# Patient Record
Sex: FEMALE | Race: WHITE | NOT HISPANIC OR LATINO | Employment: UNEMPLOYED | URBAN - METROPOLITAN AREA
[De-identification: names, ages, dates, MRNs, and addresses within clinical notes are randomized per-mention and may not be internally consistent; named-entity substitution may affect disease eponyms.]

---
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
## 2020-11-16 ENCOUNTER — HEALTH MAINTENANCE LETTER (OUTPATIENT)
Age: 20
End: 2020-11-16

## 2021-01-07 ENCOUNTER — HOSPITAL ENCOUNTER (EMERGENCY)
Facility: CLINIC | Age: 21
Discharge: HOME | End: 2021-01-07
Attending: FAMILY MEDICINE | Admitting: FAMILY MEDICINE
Payer: COMMERCIAL

## 2021-01-07 VITALS
TEMPERATURE: 99 F | BODY MASS INDEX: 32.33 KG/M2 | OXYGEN SATURATION: 98 % | HEIGHT: 67 IN | SYSTOLIC BLOOD PRESSURE: 144 MMHG | WEIGHT: 206 LBS | HEART RATE: 85 BPM | DIASTOLIC BLOOD PRESSURE: 97 MMHG | RESPIRATION RATE: 18 BRPM

## 2021-01-07 DIAGNOSIS — J02.0 STREPTOCOCCAL PHARYNGITIS: ICD-10-CM

## 2021-01-07 DIAGNOSIS — B27.90 INFECTIOUS MONONUCLEOSIS WITHOUT COMPLICATION, INFECTIOUS MONONUCLEOSIS DUE TO UNSPECIFIED ORGANISM: ICD-10-CM

## 2021-01-07 LAB
BASOPHILS # BLD AUTO: 0 10E9/L (ref 0–0.2)
BASOPHILS NFR BLD AUTO: 0 %
DEPRECATED S PYO AG THROAT QL EIA: POSITIVE
DIFFERENTIAL METHOD BLD: ABNORMAL
EOSINOPHIL # BLD AUTO: 0 10E9/L (ref 0–0.7)
EOSINOPHIL NFR BLD AUTO: 0 %
ERYTHROCYTE [DISTWIDTH] IN BLOOD BY AUTOMATED COUNT: 14.1 % (ref 10–15)
HCT VFR BLD AUTO: 37 % (ref 35–47)
HETEROPH AB SER QL: POSITIVE
HGB BLD-MCNC: 13.2 G/DL (ref 11.7–15.7)
LYMPHOCYTES # BLD AUTO: 8.3 10E9/L (ref 0.8–5.3)
LYMPHOCYTES NFR BLD AUTO: 59 %
MCH RBC QN AUTO: 33.3 PG (ref 26.5–33)
MCHC RBC AUTO-ENTMCNC: 35.7 G/DL (ref 31.5–36.5)
MCV RBC AUTO: 93 FL (ref 78–100)
MONOCYTES # BLD AUTO: 1 10E9/L (ref 0–1.3)
MONOCYTES NFR BLD AUTO: 7 %
NEUTROPHILS # BLD AUTO: 4.8 10E9/L (ref 1.6–8.3)
NEUTROPHILS NFR BLD AUTO: 34 %
PLATELET # BLD AUTO: 152 10E9/L (ref 150–450)
PLATELET # BLD EST: ABNORMAL 10*3/UL
RBC # BLD AUTO: 3.96 10E12/L (ref 3.8–5.2)
RBC MORPH BLD: NORMAL
SPECIMEN SOURCE: ABNORMAL
VARIANT LYMPHS BLD QL SMEAR: PRESENT
WBC # BLD AUTO: 14.1 10E9/L (ref 4–11)

## 2021-01-07 PROCEDURE — 99283 EMERGENCY DEPT VISIT LOW MDM: CPT | Performed by: FAMILY MEDICINE

## 2021-01-07 PROCEDURE — 99284 EMERGENCY DEPT VISIT MOD MDM: CPT | Performed by: FAMILY MEDICINE

## 2021-01-07 PROCEDURE — 85025 COMPLETE CBC W/AUTO DIFF WBC: CPT | Performed by: FAMILY MEDICINE

## 2021-01-07 PROCEDURE — 87880 STREP A ASSAY W/OPTIC: CPT | Performed by: FAMILY MEDICINE

## 2021-01-07 PROCEDURE — 86308 HETEROPHILE ANTIBODY SCREEN: CPT | Performed by: FAMILY MEDICINE

## 2021-01-07 RX ORDER — PREDNISONE 20 MG/1
40 TABLET ORAL DAILY
Qty: 14 TABLET | Refills: 0 | Status: SHIPPED | OUTPATIENT
Start: 2021-01-07 | End: 2021-01-14

## 2021-01-07 RX ORDER — CEPHALEXIN 500 MG/1
500 CAPSULE ORAL 3 TIMES DAILY
Qty: 30 CAPSULE | Refills: 0 | Status: SHIPPED | OUTPATIENT
Start: 2021-01-07 | End: 2021-01-17

## 2021-01-07 ASSESSMENT — MIFFLIN-ST. JEOR: SCORE: 1737.04

## 2021-01-07 NOTE — ED PROVIDER NOTES
History   No chief complaint on file.    HPI  Brunilda Krishna is a 20 year old female who presents with sore throat.  She says been present for 1 to 2 weeks.  She has swollen glands in her neck.  She does not have a cough.  Her maximum temperature has been 99 9.  She denies headache.  She takes thyroid replacement hormone for history of hypothyroidism due to Hashimoto's.    Allergies:  Allergies   Allergen Reactions     Augmentin        Problem List:    Patient Active Problem List    Diagnosis Date Noted     Hashimoto's thyroiditis 09/21/2016     Priority: Medium     Enlarged thyroid gland 04/12/2016     Priority: Medium     Arthritis 05/30/2014     Priority: Medium     Depression, major, single episode, moderate (H) 06/21/2013     Priority: Medium     Acne 08/09/2012     Priority: Medium     Behavior problems 01/22/2010     Priority: Medium     Frequent tantrums- in counseling           Past Medical History:    Past Medical History:   Diagnosis Date     Hypothyroid      Nonsuppurative otitis media, not specified as acute or chronic      Other developmental speech or language disorder      Reflux esophagitis      Unspecified asthma(493.90)        Past Surgical History:    Past Surgical History:   Procedure Laterality Date     PE TUBES  3/2007     PE TUBES  12-09     SURGICAL HISTORY OF -   2/5/2003     Bilateral myringotomy with tubes     SURGICAL HISTORY OF -   2/21/2005    Bilateral myringotomy with tubes and adenoidectomy       Family History:    Family History   Problem Relation Age of Onset     Asthma Mother      Asthma Maternal Grandmother      Allergies Maternal Grandmother      Diabetes Maternal Grandmother      Hypertension Maternal Grandfather      Cancer Paternal Grandmother      Hypothyroidism Other         history on both sides     C.A.D. No family hx of      Cerebrovascular Disease No family hx of      Breast Cancer No family hx of      Cancer - colorectal No family hx of      Prostate Cancer No  "family hx of        Social History:  Marital Status:  Single [1]  Social History     Tobacco Use     Smoking status: Never Smoker     Smokeless tobacco: Never Used   Substance Use Topics     Alcohol use: No     Drug use: No        Medications:         cephALEXin (KEFLEX) 500 MG capsule       predniSONE (DELTASONE) 20 MG tablet       levothyroxine (SYNTHROID/LEVOTHROID) 125 MCG tablet          Review of Systems    Further problem focused system review negative.    Physical Exam   BP: (!) 144/97  Pulse: 85  Temp: 99  F (37.2  C)  Resp: 18  Height: 170.2 cm (5' 7\")  Weight: 93.4 kg (206 lb)  SpO2: 98 %      Physical Exam    Nursing note and vitals were reviewed.  Constitutional: Awake and alert, adequately nourished and developed appearing 20-year-old in mild discomfort, who does not appear acutely ill, and who answers questions appropriately and cooperates with examination.  HEENT: Oropharynx shows tonsillar erythema and exudate with tonsils 2+ in size EOMI. voice quality is hyponasal.  No trismus is present.  Neck: Freely mobile.  There is posterior cervical adenopathy present especially on the right side  Cardiovascular: Cardiac examination reveals normal heart rate and regular rhythm without murmur.  Pulmonary/Chest: Breathing is unlabored.  Breath sounds are clear and equal bilaterally.  There no retractions, tachypnea, rales, wheezes, or rhonchi.  Neurological: Alert, oriented, thought content logical, coherent   Skin: Warm, dry, no rashes.  Psychiatric: Affect broad and appropriate.      ED Course        Procedures               Critical Care time:  none               Results for orders placed or performed during the hospital encounter of 01/07/21 (from the past 24 hour(s))   Streptococcus A Rapid Scr w Reflx to PCR    Specimen: Throat   Result Value Ref Range    Strep Specimen Description Throat     Streptococcus Group A Rapid Screen Positive (A) NEG^Negative   CBC with platelets differential   Result Value Ref " Range    WBC 14.1 (H) 4.0 - 11.0 10e9/L    RBC Count 3.96 3.8 - 5.2 10e12/L    Hemoglobin 13.2 11.7 - 15.7 g/dL    Hematocrit 37.0 35.0 - 47.0 %    MCV 93 78 - 100 fl    MCH 33.3 (H) 26.5 - 33.0 pg    MCHC 35.7 31.5 - 36.5 g/dL    RDW 14.1 10.0 - 15.0 %    Platelet Count 152 150 - 450 10e9/L    Diff Method Manual Differential     % Neutrophils 34.0 %    % Lymphocytes 59.0 %    % Monocytes 7.0 %    % Eosinophils 0.0 %    % Basophils 0.0 %    Absolute Neutrophil 4.8 1.6 - 8.3 10e9/L    Absolute Lymphocytes 8.3 (H) 0.8 - 5.3 10e9/L    Absolute Monocytes 1.0 0.0 - 1.3 10e9/L    Absolute Eosinophils 0.0 0.0 - 0.7 10e9/L    Absolute Basophils 0.0 0.0 - 0.2 10e9/L    RBC Morphology Normal     Platelet Estimate       Automated count confirmed.  Platelet morphology is normal.    Reactive Lymphs Present    Mono   Result Value Ref Range    Mononucleosis Screen Positive (A) NEG^Negative       Medications - No data to display    Assessments & Plan (with Medical Decision Making)     20-year-old presents with 1 to 2 weeks of sore throat.  Physical examination reveals normal vital signs.  There is no trismus present and no red flag signs.  There is posterior cervical adenopathy present along with tonsillar erythema and exudate.  Rapid strep test is positive.  Mono screen is positive.  CBC is as expected.  I discussed with the patient that she may have simultaneous infection with strep and mono though 1 or both of these could be false positives.  I recommended treatment for strep with cephalexin.  She gets a rash from Augmentin.  I recommended a week of prednisone for pain and swelling with the mono.  We discussed the expected clinical course of mono.  We discussed signs and symptoms that should prompt reevaluation.  She expressed understanding and her questions were all answered.    I have reviewed the nursing notes.    I have reviewed the findings, diagnosis, plan and need for follow up with the patient.       Discharge Medication  List as of 1/7/2021  7:35 AM      START taking these medications    Details   cephALEXin (KEFLEX) 500 MG capsule Take 1 capsule (500 mg) by mouth 3 times daily for 10 days, Disp-30 capsule, R-0, E-Prescribe      predniSONE (DELTASONE) 20 MG tablet Take 2 tablets (40 mg) by mouth daily for 7 days, Disp-14 tablet, R-0, E-Prescribe             Final diagnoses:   Infectious mononucleosis without complication, infectious mononucleosis due to unspecified organism   Streptococcal pharyngitis       1/7/2021   [facility] EMERGENCY DEPT     Isaac Roberson MD  01/07/21 [id number]

## 2021-01-07 NOTE — ED NOTES
Here with c/o ongoing sore throat for past 2 weeks & low grade fever. Has had strep in the past & feels similar.

## 2021-01-07 NOTE — ED AVS SNAPSHOT
[facility] Emergency Dept  [address] [address]  Phone: [phone]  Fax: [phone]                                    Brunilda Krishna   MRN: [id number]    Department: [facility] Emergency Dept   Date of Visit: 1/7/2021           After Visit Summary Signature Page    I have received my discharge instructions, and my questions have been answered. I have discussed any challenges I see with this plan with the nurse or doctor.    ..........................................................................................................................................  Patient/Patient Representative Signature      ..........................................................................................................................................  Patient Representative Print Name and Relationship to Patient    ..................................................               ................................................  Date                                   Time    ..........................................................................................................................................  Reviewed by Signature/Title    ...................................................              ..............................................  Date                                               Time          22EPIC Rev 08/18

## 2021-01-07 NOTE — DISCHARGE INSTRUCTIONS
Mononucleosis  Mononucleosis (also called mono) is a contagious viral infection. Most infants and children exposed to the virus get only mild flu-like symptoms or no symptoms at all. However, infection is usually more serious in teens and young adults. While the virus is active, it causes symptoms and can spread to others. After symptoms subside, the virus stays in the body and eventually becomes inactive. The virus can reactivate and develop symptoms, especially in people with weak immune systems.  The virus is usually spread by contact with saliva, often by kissing, or sharing food or eating utensils. It may also spread by breastmilk, blood, or sexual contact. It takes about 4 to 6 weeks to develop symptoms after exposure.  Early symptoms include headache, nausea, tiredness and general muscle aching. This is followed by sore throat and fever. Lymph glands in the neck, under the arms, or in the groin may be swollen. Symptoms usually go away in about 1 to 2 months. But they can last up to 4 months.  In the first few days to weeks, a common blood test (the monospot test) us ed to diagnose this disease may be negative even though you have the illness. In this case, other tests may be done.  Taking the antibiotics ampicillin or amoxicillin during a mono infection may cause a skin rash. This is not serious and will fade in about a week. The rash may not mean you are having an allergic reaction to the antibiotic.  Mono can cause your spleen to swell. The spleen is a fist-sized organ in the upper left abdomen that stores red blood cells. Injury to a swollen spleen can cause the spleen to rupture. This can cause life-threatening internal bleeding. To prevent this from happening, don't play contact sports or do strenuous activity for 8 weeks, or until your healthcare provider says it's OK. A sharp blow or pressure to the area could rupture a swollen spleen  Home care    Rest in bed until the fever and weakness have gone  away.    Drink plenty of fluids, but don't drink alcohol. Otherwise, you may eat a regular diet.    Ask your healthcare provider about using over-the-counter medicines to treat symptoms such as fever, pain, or an itchy rash.    Over-the-counter throat lozenges may help soothe a sore throat. Gargling with warm salt water (1/2 teaspoon in 1 glass of warm water) may also be soothing to the throat.    You may return to work or school after the fever goes away and you are feeling better. Continue to follow any activity restrictions you have been given.  Preventing spread of the virus  To limit the spread of the virus, don't expose others to your saliva for at least 6 months after your illness (no kissing or sharing utensils, drinking glasses, or toothbrushes).  Follow-up care  Follow up with your healthcare provider within 1 to 2 weeks, or as advised to be sure that there are no complications. If symptoms of extreme fatigue and swollen glands last longer than 6 months, see your healthcare provider for further testing.  When to seek medical advice  Call your healthcare provider right away if any of the following occur:    Excessive coughing    Yellow skin or eyes    Trouble swallowing    Dizziness    Paleness  Call 911  Call 911 if any of the following occur:    Severe or worsening abdominal pain    Trouble breathing  [employer] last reviewed this educational content on 3/1/2018    6550-8778 [employer]. [address] [place] [address]. All rights reserved. This information is not intended as a substitute for professional medical care. Always follow your healthcare professional's instructions.      Take cephalexin 500 mg 3 times daily for 10 days.  Prednisone 40 mg once daily for 7 days.  Avoid contact sports for 1 month.  Return if your symptoms are worsening or if you develop abdominal pain, nausea, vomiting, or other concerning symptoms.

## 2021-08-23 ENCOUNTER — APPOINTMENT (OUTPATIENT)
Dept: GENERAL RADIOLOGY | Facility: CLINIC | Age: 21
End: 2021-08-23
Attending: FAMILY MEDICINE
Payer: COMMERCIAL

## 2021-08-23 ENCOUNTER — HOSPITAL ENCOUNTER (EMERGENCY)
Facility: CLINIC | Age: 21
Discharge: HOME | End: 2021-08-23
Attending: FAMILY MEDICINE | Admitting: FAMILY MEDICINE
Payer: COMMERCIAL

## 2021-08-23 VITALS
SYSTOLIC BLOOD PRESSURE: 123 MMHG | BODY MASS INDEX: 29.76 KG/M2 | WEIGHT: 190 LBS | OXYGEN SATURATION: 99 % | DIASTOLIC BLOOD PRESSURE: 83 MMHG | HEART RATE: 76 BPM | TEMPERATURE: 98.3 F

## 2021-08-23 DIAGNOSIS — M54.50 ACUTE BILATERAL LOW BACK PAIN WITHOUT SCIATICA: ICD-10-CM

## 2021-08-23 PROCEDURE — 99283 EMERGENCY DEPT VISIT LOW MDM: CPT | Performed by: FAMILY MEDICINE

## 2021-08-23 PROCEDURE — 99284 EMERGENCY DEPT VISIT MOD MDM: CPT | Performed by: FAMILY MEDICINE

## 2021-08-23 PROCEDURE — 72100 X-RAY EXAM L-S SPINE 2/3 VWS: CPT

## 2021-08-23 NOTE — DISCHARGE INSTRUCTIONS
Return to the Emergency Room if the following occurs:     Severely worsened pain, new difficulty with bowel or bladder, new numbness or tingling between your legs when wiping, or for any concern at anytime.    Or, follow-up with the following provider as we discussed:     Consider follow-up with your primary physician as discussed.  MRI as an outpatient?  Physical therapy follow-up as discussed.  Order placed, call to schedule.    Medications discussed:    Ibuprofen 600 mg every six hours for pain (7 days duration).  Tylenol 1000 mg every six hours for pain (7 days duration).  Therefore, you can alternate these every three hours and do it safely.    If you received pain-relieving or sedating medication during your time in the ER, avoid alcohol, driving automobiles, or working with machinery.  Also, a responsible adult must stay with you.        Call the Nurse Advice Line at [phone] or [phone] for any concern at anytime.

## 2021-08-23 NOTE — ED NOTES
Pt states low back pain started 1 month ago, has been seeing the chiropractor. Pt states pain started after she stood upright from bending over to tie shoe. Pt has been missing work due to pain. Pt has not been taking any analgesics for this. Pt states pain has been getting worse and now has numbness/tingling down bilat arms and legs.

## 2021-08-23 NOTE — ED PROVIDER NOTES
HPI   The patient is a 20-year-old female presenting with low back pain and paresthesia.  The patient is here with her female partner.  The patient does not answer questions consistently.  She looks to her friend/partner for help with answering.  Together, they are able to answer most of my questions well.  The patient reports intermittent episodes of pain felt in her low back over the past month.  Symptoms apparently came on when she was standing up from a seated position.  Her pain does not seem to favor one side or the other.  She denies radiating pain into the buttocks or lower extremities.  She does report paresthesia with numbness and tingling that extends into her lower extremities though she cannot tell me to what extent.  She denies new weakness.  She denies no difficulty with bowel or bladder.  She denies numbness between her legs when wiping.  No weight loss.  No fever or infectious symptoms.  Movement and position change continue to cause her more pain.  She has not been using medication for analgesia at home.        Allergies:  Allergies   Allergen Reactions     Augmentin      Problem List:    Patient Active Problem List    Diagnosis Date Noted     Hashimoto's thyroiditis 09/21/2016     Priority: Medium     Enlarged thyroid gland 04/12/2016     Priority: Medium     Arthritis 05/30/2014     Priority: Medium     Depression, major, single episode, moderate (H) 06/21/2013     Priority: Medium     Acne 08/09/2012     Priority: Medium     Behavior problems 01/22/2010     Priority: Medium     Frequent tantrums- in counseling         Past Medical History:    Past Medical History:   Diagnosis Date     Hypothyroid      Nonsuppurative otitis media, not specified as acute or chronic      Other developmental speech or language disorder      Reflux esophagitis      Unspecified asthma(493.90)      Past Surgical History:    Past Surgical History:   Procedure Laterality Date     PE TUBES  3/2007     PE TUBES  12-09      SURGICAL HISTORY OF -   2/5/2003     Bilateral myringotomy with tubes     SURGICAL HISTORY OF -   2/21/2005    Bilateral myringotomy with tubes and adenoidectomy     Family History:    Family History   Problem Relation Age of Onset     Asthma Mother      Asthma Maternal Grandmother      Allergies Maternal Grandmother      Diabetes Maternal Grandmother      Hypertension Maternal Grandfather      Cancer Paternal Grandmother      Hypothyroidism Other         history on both sides     C.A.D. No family hx of      Cerebrovascular Disease No family hx of      Breast Cancer No family hx of      Cancer - colorectal No family hx of      Prostate Cancer No family hx of      Social History:  Marital Status:  Single [1]  Social History     Tobacco Use     Smoking status: Never Smoker     Smokeless tobacco: Never Used   Substance Use Topics     Alcohol use: No     Drug use: No      Medications:    levothyroxine (SYNTHROID/LEVOTHROID) 125 MCG tablet      Review of Systems   All other systems reviewed and are negative.      PE   BP: 135/82  Pulse: 76  Temp: 98.3  F (36.8  C)  Weight: 86.2 kg (190 lb)  SpO2: 100 %  Physical Exam  Vitals reviewed.   Constitutional:       General: She is not in acute distress.     Appearance: She is well-developed.   HENT:      Head: Normocephalic and atraumatic.      Right Ear: External ear normal.      Left Ear: External ear normal.      Nose: Nose normal.      Mouth/Throat:      Mouth: Mucous membranes are moist.      Pharynx: Oropharynx is clear.   Eyes:      Extraocular Movements: Extraocular movements intact.      Conjunctiva/sclera: Conjunctivae normal.      Pupils: Pupils are equal, round, and reactive to light.   Cardiovascular:      Rate and Rhythm: Normal rate and regular rhythm.   Pulmonary:      Effort: Pulmonary effort is normal.   Abdominal:      Palpations: Abdomen is soft.      Tenderness: There is no abdominal tenderness.   Musculoskeletal:         General: Normal range of  motion.      Cervical back: Normal range of motion.      Comments: Tenderness with palpation in the lumbar musculature, bilaterally.  Pain is worsened with flexing at the hips or waist such as with sitting up in bed.   Skin:     General: Skin is warm and dry.   Neurological:      General: No focal deficit present.      Mental Status: She is alert and oriented to person, place, and time.      Comments: Strength 5/5 in her lower extremities, bilaterally.  Sensation grossly intact to touch and equal bilaterally.   Psychiatric:         Behavior: Behavior normal.         ED COURSE and MDM   0810.  The patient has low back pain with paresthesia described.  She does report paresthesia involving her arms below the elbows and hands as well.  This seems to come and go and is not specifically associated with her back pain.  This may require further work-up by her primary physician.  No focal deficits are described or found here in the ED.  She has not had imaging of her lumbar spine.  X-ray is agreed upon.  No emergent need for MRI.  No medication for analgesia at this time.    0911.  X-ray performed and unremarkable for acute pathology.  Follow up discussed.  Physical therapy order placed.  Outpatient MRI?  Ibuprofen and Tylenol at this point for pain control.    LABS  Labs Ordered and Resulted from Time of ED Arrival Up to the Time of Departure from the ED - No data to display    IMAGING  Images reviewed by me.  Radiology report also reviewed.  XR Lumbar Spine 2/3 Views   Final Result   IMPRESSION:    5 lumbar type vertebral bodies. The vertebral bodies of the lumbar   spine otherwise abnormal stature and alignment are normal stature and   alignment without evidence of compression fracture. The disc spaces   are well-maintained. No significant degenerative changes. Soft tissues   unremarkable.      ERICKA BOOKER MD            SYSTEM ID:  [id number]          Procedures    Medications - No data to display      IMPRESSION        ICD-10-CM    1. Acute bilateral low back pain without sciatica  M54.5 Physical Therapy Referral            Medication List      There are no discharge medications for this visit.                       Александр Li MD  08/23/21 0936

## 2021-08-23 NOTE — LETTER
August 23, 2021      To Whom It May Concern:      Brunilda Krishna was seen in our Emergency Department today, 08/23/21.       Sincerely,        Александр Li MD

## 2021-09-18 ENCOUNTER — HEALTH MAINTENANCE LETTER (OUTPATIENT)
Age: 21
End: 2021-09-18

## 2022-01-08 ENCOUNTER — HEALTH MAINTENANCE LETTER (OUTPATIENT)
Age: 22
End: 2022-01-08

## 2022-11-20 ENCOUNTER — HEALTH MAINTENANCE LETTER (OUTPATIENT)
Age: 22
End: 2022-11-20

## 2022-12-28 ENCOUNTER — HOSPITAL ENCOUNTER (EMERGENCY)
Facility: CLINIC | Age: 22
Discharge: HOME | End: 2022-12-28
Attending: NURSE PRACTITIONER | Admitting: NURSE PRACTITIONER
Payer: COMMERCIAL

## 2022-12-28 VITALS
HEART RATE: 113 BPM | DIASTOLIC BLOOD PRESSURE: 99 MMHG | SYSTOLIC BLOOD PRESSURE: 141 MMHG | OXYGEN SATURATION: 99 % | TEMPERATURE: 98.1 F | RESPIRATION RATE: 18 BRPM

## 2022-12-28 DIAGNOSIS — J32.9 SINUSITIS: ICD-10-CM

## 2022-12-28 DIAGNOSIS — H66.013: ICD-10-CM

## 2022-12-28 LAB
DEPRECATED S PYO AG THROAT QL EIA: NEGATIVE
GROUP A STREP BY PCR: NOT DETECTED

## 2022-12-28 PROCEDURE — G0463 HOSPITAL OUTPT CLINIC VISIT: HCPCS | Performed by: NURSE PRACTITIONER

## 2022-12-28 PROCEDURE — 87651 STREP A DNA AMP PROBE: CPT | Performed by: NURSE PRACTITIONER

## 2022-12-28 PROCEDURE — 99213 OFFICE O/P EST LOW 20 MIN: CPT | Performed by: NURSE PRACTITIONER

## 2022-12-28 RX ORDER — LEVOFLOXACIN 750 MG/1
750 TABLET, FILM COATED ORAL DAILY
Qty: 7 TABLET | Refills: 0 | Status: SHIPPED | OUTPATIENT
Start: 2022-12-28 | End: 2023-01-04

## 2022-12-28 RX ORDER — FLUTICASONE PROPIONATE 50 MCG
2 SPRAY, SUSPENSION (ML) NASAL DAILY
Qty: 18.2 ML | Refills: 0 | Status: SHIPPED | OUTPATIENT
Start: 2022-12-28 | End: 2023-01-27

## 2022-12-28 NOTE — ED PROVIDER NOTES
History     Chief Complaint   Patient presents with     Otalgia     Sinusitis     Pharyngitis     HPI  Brunilda Krishna is a 22 year old female who presents with a 1 week history of sinus congestion, rhinorrhea, sore throat, lymph node enlargement in the neck region, bilateral ear pain, bloody purulent drainage bilaterally.  Patient denies fevers, sweats, chest pain, shortness of breath, difficulty breathing, abdominal pain, difficulty urinating or stooling.  Patient has been utilizing warm packs as needed for discomfort and over-the-counter antihistamines without improvement.  Patient denies any other concerns.    Allergies:  Allergies   Allergen Reactions     Augmentin        Problem List:    Patient Active Problem List    Diagnosis Date Noted     Hashimoto's thyroiditis 09/21/2016     Priority: Medium     Enlarged thyroid gland 04/12/2016     Priority: Medium     Arthritis 05/30/2014     Priority: Medium     Depression, major, single episode, moderate (H) 06/21/2013     Priority: Medium     Acne 08/09/2012     Priority: Medium     Behavior problems 01/22/2010     Priority: Medium     Frequent tantrums- in counseling           Past Medical History:    Past Medical History:   Diagnosis Date     Hypothyroid      Nonsuppurative otitis media, not specified as acute or chronic      Other developmental speech or language disorder      Reflux esophagitis      Unspecified asthma(493.90)        Past Surgical History:    Past Surgical History:   Procedure Laterality Date     PE TUBES  3/2007     PE TUBES  12-09     SURGICAL HISTORY OF -   2/5/2003     Bilateral myringotomy with tubes     SURGICAL HISTORY OF -   2/21/2005    Bilateral myringotomy with tubes and adenoidectomy       Family History:    Family History   Problem Relation Age of Onset     Asthma Mother      Asthma Maternal Grandmother      Allergies Maternal Grandmother      Diabetes Maternal Grandmother      Hypertension Maternal Grandfather      Cancer  Paternal Grandmother      Hypothyroidism Other         history on both sides     C.A.D. No family hx of      Cerebrovascular Disease No family hx of      Breast Cancer No family hx of      Cancer - colorectal No family hx of      Prostate Cancer No family hx of        Social History:  Marital Status:  Single [1]  Social History     Tobacco Use     Smoking status: Never     Smokeless tobacco: Never   Substance Use Topics     Alcohol use: No     Drug use: No        Medications:    fluticasone (FLONASE) 50 MCG/ACT nasal spray  levofloxacin (LEVAQUIN) 750 MG tablet  levothyroxine (SYNTHROID/LEVOTHROID) 125 MCG tablet      Review of Systems  As mentioned above in the history present illness. All other systems were reviewed and are negative.    Physical Exam   BP: (!) 141/99  Pulse: 113  Temp: 98.1  F (36.7  C)  Resp: 18  SpO2: 99 %      Physical Exam  Vitals and nursing note reviewed.   Constitutional:       General: She is not in acute distress.     Appearance: She is well-developed. She is ill-appearing. She is not toxic-appearing or diaphoretic.   HENT:      Head: Normocephalic and atraumatic.      Jaw: There is normal jaw occlusion. No trismus.      Right Ear: Hearing, ear canal and external ear normal. Drainage (purulent pink/bloody ) present. No swelling or tenderness. No mastoid tenderness. Tympanic membrane is erythematous. Tympanic membrane is not retracted.      Left Ear: Hearing, ear canal and external ear normal. Drainage (purulent pink bloody) present. No swelling or tenderness. No mastoid tenderness. Tympanic membrane is erythematous. Tympanic membrane is not retracted.      Nose: Mucosal edema, congestion and rhinorrhea present. Rhinorrhea is purulent.      Right Sinus: Maxillary sinus tenderness present. No frontal sinus tenderness.      Left Sinus: Maxillary sinus tenderness present. No frontal sinus tenderness.      Mouth/Throat:      Mouth: Mucous membranes are moist.      Pharynx: Uvula midline. No  pharyngeal swelling, oropharyngeal exudate, posterior oropharyngeal erythema or uvula swelling.      Tonsils: No tonsillar exudate or tonsillar abscesses. 0 on the right. 0 on the left.   Eyes:      General: No scleral icterus.        Right eye: No discharge.         Left eye: No discharge.      Conjunctiva/sclera: Conjunctivae normal.   Cardiovascular:      Rate and Rhythm: Normal rate and regular rhythm.      Heart sounds: Normal heart sounds. No murmur heard.    No friction rub. No gallop.   Pulmonary:      Effort: Pulmonary effort is normal. No respiratory distress.      Breath sounds: Normal breath sounds. No stridor. No wheezing or rales.   Chest:      Chest wall: No tenderness.   Abdominal:      General: Bowel sounds are normal.      Palpations: Abdomen is soft.      Tenderness: There is no abdominal tenderness.   Musculoskeletal:         General: No tenderness.      Cervical back: Normal range of motion and neck supple.   Skin:     General: Skin is warm.      Findings: No rash.   Neurological:      Mental Status: She is alert.   Psychiatric:         Behavior: Behavior is cooperative.         ED Course                 Procedures      Results for orders placed or performed during the hospital encounter of 12/28/22 (from the past 24 hour(s))   Streptococcus A Rapid Scr w Reflx to PCR    Specimen: Throat; Swab   Result Value Ref Range    Group A Strep antigen Negative Negative       Medications - No data to display    Assessments & Plan (with Medical Decision Making)     I have reviewed the nursing notes.    I have reviewed the findings, diagnosis, plan and need for follow up with the patient.  22-year-old female presents to urgent care with a 1 week history of ear pain, throat pain, headache, sinus pressure, lymphadenopathy, intermittent chills and bloody purulent drainage from bilateral ears.  Patient reports no one else around her has similar symptoms patient also reporting laryngitis.  On exam patient has  otitis media with ruptured TMs bilaterally and sinus congestion, erythema, purulent drainage and maxillary sinusitis.  Uvula is midline of the posterior pharynx and no signs of peritonsillar abscess, trismus, muffled voice is absent.  We will treat as sinusitis and otitis media and referral to ENT as patient reports chronic history and problems with this and reports every winter she battles the ear infections and sinus infections all winter long.  Discussed with patient worrisome reasons to return to the emergency room or urgent care.  Patient allergic to Augmentin and therefore Levaquin order placed and precautions reviewed.    Medical Decision Making  The patient presented with a problem that is acute and uncomplicated.    The patient's evaluation involved:  history and exam without other MDM data elements    The patient's management involved prescription drug management.        New Prescriptions    FLUTICASONE (FLONASE) 50 MCG/ACT NASAL SPRAY    Spray 2 sprays into both nostrils daily for 30 days    LEVOFLOXACIN (LEVAQUIN) 750 MG TABLET    Take 1 tablet (750 mg) by mouth daily for 7 days       Final diagnoses:   Acute suppr otitis media w spon rupt ear drum, bilateral   Sinusitis       12/28/2022   [facility] EMERGENCY DEPT     Brad, Eli Damian, NASRA CNP  12/28/22 3653

## 2022-12-28 NOTE — DISCHARGE INSTRUCTIONS
Start the Levaquin today and take 1 tablet by mouth daily for 7 days.  Start the Flonase today and do 2 sprays each nostril daily.    Follow-up with ear nose and throat specialist to be evaluated for chronic management of the chronic ear problems and sinus issues.  Levaquin has a black box warning for Achilles tendon rupture if you should develop any pain in the lower calf region stop the antibiotic immediately and contact your primary care provider.

## 2023-04-15 ENCOUNTER — HEALTH MAINTENANCE LETTER (OUTPATIENT)
Age: 23
End: 2023-04-15

## 2023-08-25 ENCOUNTER — HOSPITAL ENCOUNTER (EMERGENCY)
Facility: CLINIC | Age: 23
End: 2023-08-25
Payer: COMMERCIAL

## 2023-08-25 ENCOUNTER — HOSPITAL ENCOUNTER (OUTPATIENT)
Facility: CLINIC | Age: 23
Discharge: HOME | End: 2023-08-25
Attending: OBSTETRICS & GYNECOLOGY | Admitting: OBSTETRICS & GYNECOLOGY
Payer: COMMERCIAL

## 2023-08-25 VITALS
SYSTOLIC BLOOD PRESSURE: 123 MMHG | HEART RATE: 90 BPM | RESPIRATION RATE: 16 BRPM | DIASTOLIC BLOOD PRESSURE: 81 MMHG | TEMPERATURE: 98.2 F

## 2023-08-25 PROBLEM — Z36.89 ENCOUNTER FOR TRIAGE IN PREGNANT PATIENT: Status: ACTIVE | Noted: 2023-08-25

## 2023-08-25 LAB
ALBUMIN MFR UR ELPH: 4.4 MG/DL
ALBUMIN UR-MCNC: NEGATIVE MG/DL
ALT SERPL W P-5'-P-CCNC: 9 U/L (ref 0–50)
APPEARANCE UR: ABNORMAL
AST SERPL W P-5'-P-CCNC: 12 U/L (ref 0–45)
BACTERIA #/AREA URNS HPF: ABNORMAL /HPF
BASOPHILS # BLD AUTO: 0 10E3/UL (ref 0–0.2)
BASOPHILS NFR BLD AUTO: 0 %
BILIRUB UR QL STRIP: NEGATIVE
COLOR UR AUTO: ABNORMAL
CREAT SERPL-MCNC: 0.63 MG/DL (ref 0.51–0.95)
CREAT UR-MCNC: 23.3 MG/DL
EOSINOPHIL # BLD AUTO: 0.1 10E3/UL (ref 0–0.7)
EOSINOPHIL NFR BLD AUTO: 1 %
ERYTHROCYTE [DISTWIDTH] IN BLOOD BY AUTOMATED COUNT: 12.8 % (ref 10–15)
GFR SERPL CREATININE-BSD FRML MDRD: >90 ML/MIN/1.73M2
GLUCOSE UR STRIP-MCNC: NEGATIVE MG/DL
HCT VFR BLD AUTO: 29.6 % (ref 35–47)
HGB BLD-MCNC: 10.1 G/DL (ref 11.7–15.7)
HGB UR QL STRIP: NEGATIVE
HYALINE CASTS: 1 /LPF
IMM GRANULOCYTES # BLD: 0.1 10E3/UL
IMM GRANULOCYTES NFR BLD: 1 %
KETONES UR STRIP-MCNC: NEGATIVE MG/DL
LEUKOCYTE ESTERASE UR QL STRIP: ABNORMAL
LYMPHOCYTES # BLD AUTO: 2.2 10E3/UL (ref 0.8–5.3)
LYMPHOCYTES NFR BLD AUTO: 19 %
MCH RBC QN AUTO: 30.1 PG (ref 26.5–33)
MCHC RBC AUTO-ENTMCNC: 34.1 G/DL (ref 31.5–36.5)
MCV RBC AUTO: 88 FL (ref 78–100)
MONOCYTES # BLD AUTO: 0.6 10E3/UL (ref 0–1.3)
MONOCYTES NFR BLD AUTO: 5 %
NEUTROPHILS # BLD AUTO: 8.3 10E3/UL (ref 1.6–8.3)
NEUTROPHILS NFR BLD AUTO: 74 %
NITRATE UR QL: NEGATIVE
NRBC # BLD AUTO: 0 10E3/UL
NRBC BLD AUTO-RTO: 0 /100
PH UR STRIP: 7 [PH] (ref 5–7)
PLATELET # BLD AUTO: 195 10E3/UL (ref 150–450)
PROT/CREAT 24H UR: 0.19 MG/MG CR (ref 0–0.2)
RBC # BLD AUTO: 3.36 10E6/UL (ref 3.8–5.2)
RBC URINE: 1 /HPF
SP GR UR STRIP: 1 (ref 1–1.03)
SQUAMOUS EPITHELIAL: 2 /HPF
UROBILINOGEN UR STRIP-MCNC: NORMAL MG/DL
WBC # BLD AUTO: 11.3 10E3/UL (ref 4–11)
WBC URINE: 23 /HPF

## 2023-08-25 PROCEDURE — 59025 FETAL NON-STRESS TEST: CPT

## 2023-08-25 PROCEDURE — 87086 URINE CULTURE/COLONY COUNT: CPT | Performed by: OBSTETRICS & GYNECOLOGY

## 2023-08-25 PROCEDURE — 82565 ASSAY OF CREATININE: CPT | Performed by: OBSTETRICS & GYNECOLOGY

## 2023-08-25 PROCEDURE — 84156 ASSAY OF PROTEIN URINE: CPT | Performed by: OBSTETRICS & GYNECOLOGY

## 2023-08-25 PROCEDURE — 84460 ALANINE AMINO (ALT) (SGPT): CPT | Performed by: OBSTETRICS & GYNECOLOGY

## 2023-08-25 PROCEDURE — 36415 COLL VENOUS BLD VENIPUNCTURE: CPT | Performed by: OBSTETRICS & GYNECOLOGY

## 2023-08-25 PROCEDURE — 84450 TRANSFERASE (AST) (SGOT): CPT | Performed by: OBSTETRICS & GYNECOLOGY

## 2023-08-25 PROCEDURE — G0463 HOSPITAL OUTPT CLINIC VISIT: HCPCS | Mod: 25

## 2023-08-25 PROCEDURE — 81001 URINALYSIS AUTO W/SCOPE: CPT | Performed by: OBSTETRICS & GYNECOLOGY

## 2023-08-25 PROCEDURE — 85025 COMPLETE CBC W/AUTO DIFF WBC: CPT | Performed by: OBSTETRICS & GYNECOLOGY

## 2023-08-25 RX ORDER — LIDOCAINE 40 MG/G
CREAM TOPICAL
Status: DISCONTINUED | OUTPATIENT
Start: 2023-08-25 | End: 2023-08-25 | Stop reason: HOSPADM

## 2023-08-25 ASSESSMENT — ACTIVITIES OF DAILY LIVING (ADL)
ADLS_ACUITY_SCORE: 20
ADLS_ACUITY_SCORE: 20

## 2023-08-25 NOTE — PROGRESS NOTES
Patient presents due to cramping and lower back pain. Patient reports cramping every 30-45 minutes. Denies vaginal bleeding or leakage of fluid. +FM. Patient doctors in [place].     Unknown   39+3    moderate variablility, + accels, no decels, Category I  normal uterine activity  posterior, long, and thick    Dr. HOLGER Sanders in the department, and was informed to patient status and orders received.    Plan includes; Monitor, observe and reevaluate and Labs. Reviewed with patient and she agrees with plan.   Bill of Rights given & questions discussed?: Yes  HC Your Rights handout : Informed and given    Questions answered.    Oriented to room and call light.

## 2023-08-25 NOTE — PROGRESS NOTES
Discharge from triage.    moderate variablility, + accels, no decels, Category I  normal uterine activity    Patient contractions spreading out and patient reports she is not feeling them.   Lab results reviewed by Dr. Sanders.     Admission on 08/25/2023   Component Date Value    Color Urine 08/25/2023 Straw     Appearance Urine 08/25/2023 Slightly Cloudy (A)     Glucose Urine 08/25/2023 Negative     Bilirubin Urine 08/25/2023 Negative     Ketones Urine 08/25/2023 Negative     Specific Gravity Urine 08/25/2023 1.003     Blood Urine 08/25/2023 Negative     pH Urine 08/25/2023 7.0     Protein Albumin Urine 08/25/2023 Negative     Urobilinogen Urine 08/25/2023 Normal     Nitrite Urine 08/25/2023 Negative     Leukocyte Esterase Urine 08/25/2023 Large (A)     Bacteria Urine 08/25/2023 Moderate (A)     RBC Urine 08/25/2023 1     WBC Urine 08/25/2023 23 (H)     Squamous Epithelials Uri* 08/25/2023 2 (H)     Hyaline Casts Urine 08/25/2023 1     AST 08/25/2023 12     ALT 08/25/2023 9     Creatinine 08/25/2023 0.63     GFR Estimate 08/25/2023 >90     Total Protein Urine mg/dL 08/25/2023 4.4     Total Protein Urine mg/m* 08/25/2023 0.19     Creatinine Urine mg/dL 08/25/2023 23.3     WBC Count 08/25/2023 11.3 (H)     RBC Count 08/25/2023 3.36 (L)     Hemoglobin 08/25/2023 10.1 (L)     Hematocrit 08/25/2023 29.6 (L)     MCV 08/25/2023 88     MCH 08/25/2023 30.1     MCHC 08/25/2023 34.1     RDW 08/25/2023 12.8     Platelet Count 08/25/2023 195     % Neutrophils 08/25/2023 74     % Lymphocytes 08/25/2023 19     % Monocytes 08/25/2023 5     % Eosinophils 08/25/2023 1     % Basophils 08/25/2023 0     % Immature Granulocytes 08/25/2023 1     NRBCs per 100 WBC 08/25/2023 0     Absolute Neutrophils 08/25/2023 8.3     Absolute Lymphocytes 08/25/2023 2.2     Absolute Monocytes 08/25/2023 0.6     Absolute Eosinophils 08/25/2023 0.1     Absolute Basophils 08/25/2023 0.0     Absolute Immature Granul* 08/25/2023 0.1     Absolute NRBCs  08/25/2023 0.0        Dr. HOLGER Sanders informed of above and discharge order received.     Plan includes: Labor instuctions given Fetal kick count instructions given. Follow up clinic appointment: on Monday with primary OB provider. Patient instructed to report any recurrence of above concerns to her primary care provider during clinic hours or [facility] at any other time. Patient verbalized understanding of After Visit Summary, education and agreement with plan. Agrees to call for any problems, questions or concerns.    Discharged undelivered via ambulatory in stable condition with all belongings. Accompanied by significant other.

## 2023-08-25 NOTE — PROGRESS NOTES
Provider Notification     Message: BPs have been in the normal range. Lab results are in as well.     Dr. Sanders paged at [phone].    Orders: MD responded at [phone]. Labs look fine. Patient should be seen on Monday for appt to recheck BP with primary OB.

## 2023-08-25 NOTE — DISCHARGE INSTRUCTIONS
Discharge Instructions for Undelivered Patients  [facility] Phone # [phone]  Triage Phone # [phone]    Diet:  * Drink 8 to 12 glasses of liquids (milk, juice, water) every day  * You may eat meals and snacks.    Activity:  * Rest.  * Count fetal kicks every day (see handout).  * Call your doctor or nurse midwife if your baby is moving less than usual.    Call your provider if you notice:  * Swelling in your face or increased swelling in your hands or legs.  * Headaches that are not relieved by Tylenol (acetaminophen).  * Changes in your vision (blurring; seeing spots or stars).  * Nausea (sick to your stomach) and vomiting (throwing up).  * Weight gain of 5 pounds per week.  * Heartburn that doesn't go away.  * Signs of bladder infection: Pain when you urinate (use the toilet), needing to go more often or more urgently.  * The bag of winters (membrane) breaks, or you notice leaking in your underwear.  * Bright red blood in your underwear.  * Abdominal (lower belly) or stomach pain.  * For first baby: Contractions (tightenings) less than 5 minutes apart for one hour or more.  * Increase or change in vaginal discharge (note the color and amount).    Follow up with your OB/GYN provider on Monday for a BP recheck.

## 2023-08-26 LAB — BACTERIA UR CULT: NORMAL

## 2023-09-03 ENCOUNTER — HOSPITAL ENCOUNTER (INPATIENT)
Facility: CLINIC | Age: 23
LOS: 3 days | Discharge: HOME | End: 2023-09-06
Attending: OBSTETRICS & GYNECOLOGY | Admitting: OBSTETRICS & GYNECOLOGY
Payer: COMMERCIAL

## 2023-09-03 DIAGNOSIS — O13.3 GESTATIONAL HYPERTENSION, THIRD TRIMESTER: Primary | ICD-10-CM

## 2023-09-03 DIAGNOSIS — D62 ANEMIA DUE TO BLOOD LOSS, ACUTE: ICD-10-CM

## 2023-09-03 PROBLEM — Z34.03 ENCOUNTER FOR SUPERVISION OF NORMAL FIRST PREGNANCY IN THIRD TRIMESTER: Status: ACTIVE | Noted: 2023-09-03

## 2023-09-03 PROCEDURE — 120N000001 HC R&B MED SURG/OB

## 2023-09-03 RX ORDER — PROCHLORPERAZINE 25 MG
25 SUPPOSITORY, RECTAL RECTAL EVERY 12 HOURS PRN
Status: DISCONTINUED | OUTPATIENT
Start: 2023-09-03 | End: 2023-09-04 | Stop reason: HOSPADM

## 2023-09-03 RX ORDER — SODIUM CHLORIDE, SODIUM LACTATE, POTASSIUM CHLORIDE, CALCIUM CHLORIDE 600; 310; 30; 20 MG/100ML; MG/100ML; MG/100ML; MG/100ML
INJECTION, SOLUTION INTRAVENOUS CONTINUOUS
Status: DISCONTINUED | OUTPATIENT
Start: 2023-09-03 | End: 2023-09-04 | Stop reason: HOSPADM

## 2023-09-03 RX ORDER — METOCLOPRAMIDE HYDROCHLORIDE 5 MG/ML
10 INJECTION INTRAMUSCULAR; INTRAVENOUS EVERY 6 HOURS PRN
Status: DISCONTINUED | OUTPATIENT
Start: 2023-09-03 | End: 2023-09-04 | Stop reason: HOSPADM

## 2023-09-03 RX ORDER — OXYTOCIN 10 [USP'U]/ML
10 INJECTION, SOLUTION INTRAMUSCULAR; INTRAVENOUS
Status: DISCONTINUED | OUTPATIENT
Start: 2023-09-03 | End: 2023-09-04 | Stop reason: HOSPADM

## 2023-09-03 RX ORDER — CARBOPROST TROMETHAMINE 250 UG/ML
250 INJECTION, SOLUTION INTRAMUSCULAR
Status: DISCONTINUED | OUTPATIENT
Start: 2023-09-03 | End: 2023-09-04 | Stop reason: HOSPADM

## 2023-09-03 RX ORDER — METHYLERGONOVINE MALEATE 0.2 MG/ML
200 INJECTION INTRAVENOUS
Status: DISCONTINUED | OUTPATIENT
Start: 2023-09-03 | End: 2023-09-04 | Stop reason: HOSPADM

## 2023-09-03 RX ORDER — METOCLOPRAMIDE 10 MG/1
10 TABLET ORAL EVERY 6 HOURS PRN
Status: DISCONTINUED | OUTPATIENT
Start: 2023-09-03 | End: 2023-09-04 | Stop reason: HOSPADM

## 2023-09-03 RX ORDER — MISOPROSTOL 200 UG/1
800 TABLET ORAL
Status: DISCONTINUED | OUTPATIENT
Start: 2023-09-03 | End: 2023-09-04 | Stop reason: HOSPADM

## 2023-09-03 RX ORDER — NALOXONE HYDROCHLORIDE 0.4 MG/ML
0.4 INJECTION, SOLUTION INTRAMUSCULAR; INTRAVENOUS; SUBCUTANEOUS
Status: DISCONTINUED | OUTPATIENT
Start: 2023-09-03 | End: 2023-09-04 | Stop reason: HOSPADM

## 2023-09-03 RX ORDER — NALOXONE HYDROCHLORIDE 0.4 MG/ML
0.2 INJECTION, SOLUTION INTRAMUSCULAR; INTRAVENOUS; SUBCUTANEOUS
Status: DISCONTINUED | OUTPATIENT
Start: 2023-09-03 | End: 2023-09-04 | Stop reason: HOSPADM

## 2023-09-03 RX ORDER — LIDOCAINE 40 MG/G
CREAM TOPICAL
Status: DISCONTINUED | OUTPATIENT
Start: 2023-09-03 | End: 2023-09-04 | Stop reason: HOSPADM

## 2023-09-03 RX ORDER — ONDANSETRON 2 MG/ML
4 INJECTION INTRAMUSCULAR; INTRAVENOUS EVERY 6 HOURS PRN
Status: DISCONTINUED | OUTPATIENT
Start: 2023-09-03 | End: 2023-09-04 | Stop reason: HOSPADM

## 2023-09-03 RX ORDER — PROCHLORPERAZINE MALEATE 10 MG
10 TABLET ORAL EVERY 6 HOURS PRN
Status: DISCONTINUED | OUTPATIENT
Start: 2023-09-03 | End: 2023-09-04 | Stop reason: HOSPADM

## 2023-09-03 RX ORDER — KETOROLAC TROMETHAMINE 30 MG/ML
30 INJECTION, SOLUTION INTRAMUSCULAR; INTRAVENOUS
Status: DISCONTINUED | OUTPATIENT
Start: 2023-09-03 | End: 2023-09-04 | Stop reason: HOSPADM

## 2023-09-03 RX ORDER — OXYTOCIN/0.9 % SODIUM CHLORIDE 30/500 ML
340 PLASTIC BAG, INJECTION (ML) INTRAVENOUS CONTINUOUS PRN
Status: DISCONTINUED | OUTPATIENT
Start: 2023-09-03 | End: 2023-09-04 | Stop reason: HOSPADM

## 2023-09-03 RX ORDER — ONDANSETRON 4 MG/1
4 TABLET, ORALLY DISINTEGRATING ORAL EVERY 6 HOURS PRN
Status: DISCONTINUED | OUTPATIENT
Start: 2023-09-03 | End: 2023-09-04 | Stop reason: HOSPADM

## 2023-09-03 RX ORDER — MISOPROSTOL 200 UG/1
400 TABLET ORAL
Status: DISCONTINUED | OUTPATIENT
Start: 2023-09-03 | End: 2023-09-04 | Stop reason: HOSPADM

## 2023-09-03 RX ORDER — TRANEXAMIC ACID 10 MG/ML
1 INJECTION, SOLUTION INTRAVENOUS EVERY 30 MIN PRN
Status: DISCONTINUED | OUTPATIENT
Start: 2023-09-03 | End: 2023-09-04 | Stop reason: HOSPADM

## 2023-09-03 RX ORDER — CITRIC ACID/SODIUM CITRATE 334-500MG
30 SOLUTION, ORAL ORAL
Status: DISCONTINUED | OUTPATIENT
Start: 2023-09-03 | End: 2023-09-04 | Stop reason: HOSPADM

## 2023-09-03 RX ORDER — OXYTOCIN/0.9 % SODIUM CHLORIDE 30/500 ML
100-340 PLASTIC BAG, INJECTION (ML) INTRAVENOUS CONTINUOUS PRN
Status: DISCONTINUED | OUTPATIENT
Start: 2023-09-03 | End: 2023-09-04 | Stop reason: HOSPADM

## 2023-09-03 RX ORDER — IBUPROFEN 800 MG/1
800 TABLET, FILM COATED ORAL
Status: DISCONTINUED | OUTPATIENT
Start: 2023-09-03 | End: 2023-09-04 | Stop reason: HOSPADM

## 2023-09-03 ASSESSMENT — ACTIVITIES OF DAILY LIVING (ADL)
FALL_HISTORY_WITHIN_LAST_SIX_MONTHS: YES
VISION_MANAGEMENT: GLASSES
CONCENTRATING,_REMEMBERING_OR_MAKING_DECISIONS_DIFFICULTY: NO
NUMBER_OF_TIMES_PATIENT_HAS_FALLEN_WITHIN_LAST_SIX_MONTHS: 1
DIFFICULTY_EATING/SWALLOWING: NO
DRESSING/BATHING_DIFFICULTY: NO
ADLS_ACUITY_SCORE: 35
WALKING_OR_CLIMBING_STAIRS_DIFFICULTY: NO
WEAR_GLASSES_OR_BLIND: YES
TOILETING_ISSUES: NO
DOING_ERRANDS_INDEPENDENTLY_DIFFICULTY: NO
CHANGE_IN_FUNCTIONAL_STATUS_SINCE_ONSET_OF_CURRENT_ILLNESS/INJURY: NO

## 2023-09-04 ENCOUNTER — ANESTHESIA EVENT (OUTPATIENT)
Dept: OBGYN | Facility: CLINIC | Age: 23
End: 2023-09-04
Payer: COMMERCIAL

## 2023-09-04 ENCOUNTER — ANESTHESIA (OUTPATIENT)
Dept: OBGYN | Facility: CLINIC | Age: 23
End: 2023-09-04
Payer: COMMERCIAL

## 2023-09-04 LAB
ABO/RH(D): NORMAL
ALBUMIN MFR UR ELPH: 5.4 MG/DL
ANTIBODY SCREEN: NEGATIVE
CREAT UR-MCNC: 28.6 MG/DL
ERYTHROCYTE [DISTWIDTH] IN BLOOD BY AUTOMATED COUNT: 13 % (ref 10–15)
HCT VFR BLD AUTO: 28.6 % (ref 35–47)
HGB BLD-MCNC: 10 G/DL (ref 11.7–15.7)
HOLD SPECIMEN: NORMAL
MCH RBC QN AUTO: 30.3 PG (ref 26.5–33)
MCHC RBC AUTO-ENTMCNC: 35 G/DL (ref 31.5–36.5)
MCV RBC AUTO: 87 FL (ref 78–100)
PLATELET # BLD AUTO: 200 10E3/UL (ref 150–450)
PROT/CREAT 24H UR: 0.19 MG/MG CR (ref 0–0.2)
RBC # BLD AUTO: 3.3 10E6/UL (ref 3.8–5.2)
SPECIMEN EXPIRATION DATE: NORMAL
T PALLIDUM AB SER QL: NONREACTIVE
WBC # BLD AUTO: 15.4 10E3/UL (ref 4–11)

## 2023-09-04 PROCEDURE — 120N000001 HC R&B MED SURG/OB

## 2023-09-04 PROCEDURE — 85027 COMPLETE CBC AUTOMATED: CPT | Performed by: OBSTETRICS & GYNECOLOGY

## 2023-09-04 PROCEDURE — 250N000009 HC RX 250: Performed by: NURSE ANESTHETIST, CERTIFIED REGISTERED

## 2023-09-04 PROCEDURE — 84443 ASSAY THYROID STIM HORMONE: CPT | Performed by: OBSTETRICS & GYNECOLOGY

## 2023-09-04 PROCEDURE — 10907ZC DRAINAGE OF AMNIOTIC FLUID, THERAPEUTIC FROM PRODUCTS OF CONCEPTION, VIA NATURAL OR ARTIFICIAL OPENING: ICD-10-PCS | Performed by: OBSTETRICS & GYNECOLOGY

## 2023-09-04 PROCEDURE — 36415 COLL VENOUS BLD VENIPUNCTURE: CPT | Performed by: OBSTETRICS & GYNECOLOGY

## 2023-09-04 PROCEDURE — 84156 ASSAY OF PROTEIN URINE: CPT | Performed by: OBSTETRICS & GYNECOLOGY

## 2023-09-04 PROCEDURE — 00HU33Z INSERTION OF INFUSION DEVICE INTO SPINAL CANAL, PERCUTANEOUS APPROACH: ICD-10-PCS | Performed by: NURSE ANESTHETIST, CERTIFIED REGISTERED

## 2023-09-04 PROCEDURE — 3E0R3BZ INTRODUCTION OF ANESTHETIC AGENT INTO SPINAL CANAL, PERCUTANEOUS APPROACH: ICD-10-PCS | Performed by: NURSE ANESTHETIST, CERTIFIED REGISTERED

## 2023-09-04 PROCEDURE — 250N000011 HC RX IP 250 OP 636: Performed by: NURSE ANESTHETIST, CERTIFIED REGISTERED

## 2023-09-04 PROCEDURE — 370N000003 HC ANESTHESIA WARD SERVICE: Performed by: NURSE ANESTHETIST, CERTIFIED REGISTERED

## 2023-09-04 PROCEDURE — 722N000001 HC LABOR CARE VAGINAL DELIVERY SINGLE

## 2023-09-04 PROCEDURE — 250N000013 HC RX MED GY IP 250 OP 250 PS 637: Performed by: OBSTETRICS & GYNECOLOGY

## 2023-09-04 PROCEDURE — 258N000003 HC RX IP 258 OP 636: Performed by: OBSTETRICS & GYNECOLOGY

## 2023-09-04 PROCEDURE — 59409 OBSTETRICAL CARE: CPT | Performed by: OBSTETRICS & GYNECOLOGY

## 2023-09-04 PROCEDURE — 0UQMXZZ REPAIR VULVA, EXTERNAL APPROACH: ICD-10-PCS | Performed by: OBSTETRICS & GYNECOLOGY

## 2023-09-04 PROCEDURE — 250N000009 HC RX 250: Performed by: OBSTETRICS & GYNECOLOGY

## 2023-09-04 PROCEDURE — 86900 BLOOD TYPING SEROLOGIC ABO: CPT | Performed by: OBSTETRICS & GYNECOLOGY

## 2023-09-04 PROCEDURE — 86780 TREPONEMA PALLIDUM: CPT | Performed by: OBSTETRICS & GYNECOLOGY

## 2023-09-04 RX ORDER — TRANEXAMIC ACID 10 MG/ML
1 INJECTION, SOLUTION INTRAVENOUS EVERY 30 MIN PRN
Status: DISCONTINUED | OUTPATIENT
Start: 2023-09-04 | End: 2023-09-06 | Stop reason: HOSPADM

## 2023-09-04 RX ORDER — MISOPROSTOL 200 UG/1
800 TABLET ORAL
Status: DISCONTINUED | OUTPATIENT
Start: 2023-09-04 | End: 2023-09-06 | Stop reason: HOSPADM

## 2023-09-04 RX ORDER — LIDOCAINE HYDROCHLORIDE AND EPINEPHRINE 15; 5 MG/ML; UG/ML
INJECTION, SOLUTION EPIDURAL PRN
Status: DISCONTINUED | OUTPATIENT
Start: 2023-09-04 | End: 2023-09-04

## 2023-09-04 RX ORDER — ONDANSETRON 4 MG/1
4 TABLET, ORALLY DISINTEGRATING ORAL EVERY 6 HOURS PRN
Status: DISCONTINUED | OUTPATIENT
Start: 2023-09-04 | End: 2023-09-04 | Stop reason: HOSPADM

## 2023-09-04 RX ORDER — BISACODYL 10 MG
10 SUPPOSITORY, RECTAL RECTAL DAILY PRN
Status: DISCONTINUED | OUTPATIENT
Start: 2023-09-04 | End: 2023-09-06 | Stop reason: HOSPADM

## 2023-09-04 RX ORDER — IBUPROFEN 800 MG/1
800 TABLET, FILM COATED ORAL EVERY 6 HOURS PRN
Status: DISCONTINUED | OUTPATIENT
Start: 2023-09-04 | End: 2023-09-06 | Stop reason: HOSPADM

## 2023-09-04 RX ORDER — ACETAMINOPHEN 325 MG/1
650 TABLET ORAL EVERY 4 HOURS PRN
Status: DISCONTINUED | OUTPATIENT
Start: 2023-09-04 | End: 2023-09-06 | Stop reason: HOSPADM

## 2023-09-04 RX ORDER — DOCUSATE SODIUM 100 MG/1
100 CAPSULE, LIQUID FILLED ORAL DAILY
Status: DISCONTINUED | OUTPATIENT
Start: 2023-09-04 | End: 2023-09-06 | Stop reason: HOSPADM

## 2023-09-04 RX ORDER — MODIFIED LANOLIN
OINTMENT (GRAM) TOPICAL
Status: DISCONTINUED | OUTPATIENT
Start: 2023-09-04 | End: 2023-09-06 | Stop reason: HOSPADM

## 2023-09-04 RX ORDER — OXYTOCIN/0.9 % SODIUM CHLORIDE 30/500 ML
340 PLASTIC BAG, INJECTION (ML) INTRAVENOUS CONTINUOUS PRN
Status: DISCONTINUED | OUTPATIENT
Start: 2023-09-04 | End: 2023-09-06 | Stop reason: HOSPADM

## 2023-09-04 RX ORDER — ENOXAPARIN SODIUM 100 MG/ML
40 INJECTION SUBCUTANEOUS EVERY 24 HOURS
Status: DISCONTINUED | OUTPATIENT
Start: 2023-09-05 | End: 2023-09-06 | Stop reason: HOSPADM

## 2023-09-04 RX ORDER — MISOPROSTOL 200 UG/1
400 TABLET ORAL
Status: DISCONTINUED | OUTPATIENT
Start: 2023-09-04 | End: 2023-09-06 | Stop reason: HOSPADM

## 2023-09-04 RX ORDER — NALBUPHINE HYDROCHLORIDE 10 MG/ML
2.5-5 INJECTION, SOLUTION INTRAMUSCULAR; INTRAVENOUS; SUBCUTANEOUS EVERY 6 HOURS PRN
Status: DISCONTINUED | OUTPATIENT
Start: 2023-09-04 | End: 2023-09-04

## 2023-09-04 RX ORDER — OXYTOCIN 10 [USP'U]/ML
10 INJECTION, SOLUTION INTRAMUSCULAR; INTRAVENOUS
Status: DISCONTINUED | OUTPATIENT
Start: 2023-09-04 | End: 2023-09-06 | Stop reason: HOSPADM

## 2023-09-04 RX ORDER — ONDANSETRON 2 MG/ML
4 INJECTION INTRAMUSCULAR; INTRAVENOUS EVERY 6 HOURS PRN
Status: DISCONTINUED | OUTPATIENT
Start: 2023-09-04 | End: 2023-09-04 | Stop reason: HOSPADM

## 2023-09-04 RX ORDER — FENTANYL CITRATE 50 UG/ML
INJECTION, SOLUTION INTRAMUSCULAR; INTRAVENOUS PRN
Status: DISCONTINUED | OUTPATIENT
Start: 2023-09-04 | End: 2023-09-04

## 2023-09-04 RX ORDER — HYDROCORTISONE 25 MG/G
CREAM TOPICAL 3 TIMES DAILY PRN
Status: DISCONTINUED | OUTPATIENT
Start: 2023-09-04 | End: 2023-09-06 | Stop reason: HOSPADM

## 2023-09-04 RX ORDER — CARBOPROST TROMETHAMINE 250 UG/ML
250 INJECTION, SOLUTION INTRAMUSCULAR
Status: DISCONTINUED | OUTPATIENT
Start: 2023-09-04 | End: 2023-09-06 | Stop reason: HOSPADM

## 2023-09-04 RX ORDER — FENTANYL CITRATE-0.9 % NACL/PF 10 MCG/ML
100 PLASTIC BAG, INJECTION (ML) INTRAVENOUS EVERY 5 MIN PRN
Status: DISCONTINUED | OUTPATIENT
Start: 2023-09-04 | End: 2023-09-04 | Stop reason: HOSPADM

## 2023-09-04 RX ORDER — METHYLERGONOVINE MALEATE 0.2 MG/ML
200 INJECTION INTRAVENOUS
Status: DISCONTINUED | OUTPATIENT
Start: 2023-09-04 | End: 2023-09-06 | Stop reason: HOSPADM

## 2023-09-04 RX ORDER — LIDOCAINE HYDROCHLORIDE 10 MG/ML
INJECTION, SOLUTION INFILTRATION; PERINEURAL PRN
Status: DISCONTINUED | OUTPATIENT
Start: 2023-09-04 | End: 2023-09-04

## 2023-09-04 RX ADMIN — LIDOCAINE HYDROCHLORIDE AND EPINEPHRINE 5 ML: 15; 5 INJECTION, SOLUTION EPIDURAL at 02:45

## 2023-09-04 RX ADMIN — FENTANYL CITRATE 100 MCG: 50 INJECTION, SOLUTION INTRAMUSCULAR; INTRAVENOUS at 02:45

## 2023-09-04 RX ADMIN — LIDOCAINE HYDROCHLORIDE 7 ML: 10 INJECTION, SOLUTION INFILTRATION; PERINEURAL at 02:35

## 2023-09-04 RX ADMIN — Medication: at 02:48

## 2023-09-04 RX ADMIN — Medication 10 ML/HR: at 02:46

## 2023-09-04 RX ADMIN — Medication 12 ML/HR: at 02:47

## 2023-09-04 RX ADMIN — LEVOTHYROXINE SODIUM 62.5 MCG: 0.05 TABLET ORAL at 08:34

## 2023-09-04 RX ADMIN — SODIUM CHLORIDE, POTASSIUM CHLORIDE, SODIUM LACTATE AND CALCIUM CHLORIDE 1000 ML: 600; 310; 30; 20 INJECTION, SOLUTION INTRAVENOUS at 02:17

## 2023-09-04 RX ADMIN — DOCUSATE SODIUM 100 MG: 100 CAPSULE, LIQUID FILLED ORAL at 15:09

## 2023-09-04 RX ADMIN — IBUPROFEN 800 MG: 800 TABLET ORAL at 15:09

## 2023-09-04 RX ADMIN — Medication: at 08:45

## 2023-09-04 RX ADMIN — LIDOCAINE HYDROCHLORIDE 5 ML: 10 INJECTION, SOLUTION EPIDURAL; INFILTRATION; INTRACAUDAL; PERINEURAL at 12:46

## 2023-09-04 RX ADMIN — Medication 340 ML/HR: at 12:44

## 2023-09-04 ASSESSMENT — ACTIVITIES OF DAILY LIVING (ADL)
ADLS_ACUITY_SCORE: 20

## 2023-09-04 NOTE — PROGRESS NOTES
Pt arrived from home with significant other and family member to [facility]. Contractions started at approximately 2130 this evening. Currently pt has been cody every 2-3 min. R Cat 1, 4cm/100/0 station. Intact. Dr Mari updated and will be admitting pt.   Yulia May RN on 9/3/2023 at 11:16 PM

## 2023-09-04 NOTE — ANESTHESIA PREPROCEDURE EVALUATION
Anesthesia Pre-Procedure Evaluation    Patient: Brunilda Krishna   MRN: [id number] : 2000        Procedure :           Past Medical History:   Diagnosis Date    Hypothyroid     Nonsuppurative otitis media, not specified as acute or chronic     Recurrent Otitis     Other developmental speech or language disorder     Speech Delay    Reflux esophagitis     Reflux-Resolved    Unspecified asthma(493.90)     RAD      Past Surgical History:   Procedure Laterality Date    PE TUBES  3/2007    PE TUBES  12-09    SURGICAL HISTORY OF -   2003     Bilateral myringotomy with tubes    SURGICAL HISTORY OF -   2005    Bilateral myringotomy with tubes and adenoidectomy      Allergies   Allergen Reactions    Amoxicillin-Pot Clavulanate       Social History     Tobacco Use    Smoking status: Never    Smokeless tobacco: Never   Substance Use Topics    Alcohol use: No      Wt Readings from Last 1 Encounters:   23 109.2 kg (240 lb 12.8 oz)        Anesthesia Evaluation   Pt has had prior anesthetic. Type: General.    No history of anesthetic complications       ROS/MED HX  ENT/Pulmonary:     (+)                     asthma                  Neurologic:  - neg neurologic ROS     Cardiovascular:       METS/Exercise Tolerance:     Hematologic:     (+)      anemia,          Musculoskeletal:       GI/Hepatic:     (+) GERD,                   Renal/Genitourinary:       Endo:     (+)          thyroid problem,     Obesity,       Psychiatric/Substance Use:     (+) psychiatric history other (comment) and depression       Infectious Disease:       Malignancy:       Other:     (-) previous  and TOLAC candidate       Physical Exam    Airway        Mallampati: II   TM distance: > 3 FB   Neck ROM: full     Respiratory Devices and Support         Dental  no notable dental history         Cardiovascular   cardiovascular exam normal          Pulmonary   pulmonary exam normal                OUTSIDE LABS:  CBC:   Lab Results    Component Value Date    WBC 15.4 (H) 09/04/2023    WBC 11.3 (H) 08/25/2023    HGB 10.0 (L) 09/04/2023    HGB 10.1 (L) 08/25/2023    HCT 28.6 (L) 09/04/2023    HCT 29.6 (L) 08/25/2023     09/04/2023     08/25/2023     BMP:   Lab Results   Component Value Date     03/31/2016    POTASSIUM 4.0 03/31/2016    CHLORIDE 105 03/31/2016    CO2 26 03/31/2016    BUN 20 (H) 03/31/2016    CR 0.63 08/25/2023    CR 0.65 03/31/2016    GLC 84 03/31/2016    GLC 77 01/29/2014     COAGS: No results found for: PTT, INR, FIBR  POC:   Lab Results   Component Value Date    HCGS Negative 12/08/2014     HEPATIC:   Lab Results   Component Value Date    ALBUMIN 4.5 03/31/2016    PROTTOTAL 7.9 03/31/2016    ALT 9 08/25/2023    AST 12 08/25/2023    ALKPHOS 111 03/31/2016    BILITOTAL 0.3 03/31/2016     OTHER:   Lab Results   Component Value Date    SPARKLE 9.3 03/31/2016    TSH 2.10 05/08/2018    T4 1.04 05/08/2018    T3 126 03/31/2016    SED 10 05/29/2014       Anesthesia Plan    ASA Status:  3       Anesthesia Type: Epidural.              Consents    Anesthesia Plan(s) and associated risks, benefits, and realistic alternatives discussed. Questions answered and patient/representative(s) expressed understanding.     - Discussed:     - Discussed with:  Patient            Postoperative Care            Comments:           neg OB ROS.       NASRA Rodríguez CRNA

## 2023-09-04 NOTE — PROGRESS NOTES
0025-up to the bathroom  0100-walking in the hallway  0200-SVE 6/100/-1 done by nurse. Pt would like Epidural at this time.  CRNA notified.   0229-epidural placed and dosed.   0300-Pt rated pain 2/10  0312-SVE 6/100/-1  0500-bladder emptied 400mL, SVE done by other 6/100/-1, intact  0515-pt on right side with peanut ball.  0545-pt on left side with peanut ball.  0620-pt on right side with peanut ball.  0649-pt on left side with peanut ball.    Pt VS stable. Rates pain at 2/10.

## 2023-09-04 NOTE — ANESTHESIA POSTPROCEDURE EVALUATION
Patient: Brunilda Krishna    Procedure: * No procedures listed *       Anesthesia Type:  Epidural    Note:  Disposition: Inpatient   Postop Pain Control: Uneventful            Sign Out: Well controlled pain   PONV: No   Neuro/Psych: Uneventful            Sign Out: Acceptable/Baseline neuro status   Airway/Respiratory: Uneventful            Sign Out: Acceptable/Baseline resp. status   CV/Hemodynamics: Uneventful            Sign Out: Acceptable CV status; No obvious hypovolemia; No obvious fluid overload   Other NRE: NONE   DID A NON-ROUTINE EVENT OCCUR? No           Last vitals:  Vitals:    09/04/23 1330 09/04/23 1345 09/04/23 1400   BP: (!) 126/91 117/83 109/75   Pulse:      Resp:      Temp:      SpO2:          Electronically Signed By: Woo Batres CRNA, APRN JINA  September 4, 2023  2:47 PM

## 2023-09-04 NOTE — H&P
[facility] Labor and Delivery History and Physical    Brunilda Krishna MRN# [id number]   Age: 22 year old YOB: 2000     Date of Admission:  9/3/2023    Primary care provider: No Ref-Primary, Physician           Chief Complaint:   Brunilda Krishna is a 22 year old female who is 40+2 weeks  pregnant  by 9+5 week ULTRASOUND and being admitted for active labor management.          Pregnancy history:   She has had her prenatal care in the [facility] system.  She was scheduled to be induced @ [facility] on Tuesday of this week.  Her pregnancy has been uncomplicated to date.  Contractions began @ 21:00 tonight.  She lives close by and decided to come here for her delivery.  She denies LOF or vaginal bleeding.  She is having a girl and her fetus has been active today.  She is accompanied by  the FOB (Leonidas) and his mother  OBSTETRIC HISTORY:    OB History    Para Term  AB Living   1 0 0 0 0 0   SAB IAB Ectopic Multiple Live Births   0 0 0 0 0      # Outcome Date GA Lbr Fredy/2nd Weight Sex Delivery Anes PTL Lv   1 Current                EDC: Estimated Date of Delivery: 2023    Prenatal Labs:   Lab Results   Component Value Date    HGB 10.1 (L) 2023       GBS Status:   No results found for: GBS    Active Problem List  Patient Active Problem List   Diagnosis    Behavior problems    Acne    Depression, major, single episode, moderate (H)    Arthritis    Enlarged thyroid gland    Hashimoto's thyroiditis    Encounter for triage in pregnant patient    Encounter for supervision of normal first pregnancy in third trimester       Medication Prior to Admission  Medications Prior to Admission   Medication Sig Dispense Refill Last Dose    levothyroxine (SYNTHROID/LEVOTHROID) 125 MCG tablet Take 0.5 tablets (62.5 mcg) by mouth daily 16 tablet 11     Prenatal Vit-Fe Fumarate-FA (PRENATAL VITAMIN PO)       .        Maternal Past Medical History:     Past Medical History:   Diagnosis Date     Hypothyroid     Nonsuppurative otitis media, not specified as acute or chronic     Recurrent Otitis     Other developmental speech or language disorder     Speech Delay    Reflux esophagitis     Reflux-Resolved    Unspecified asthma(493.90)     RAD                       Family History:   I have reviewed this patient's family history            Social History:   I have reviewed this patient's social history         Review of Systems:   The Review of Systems is negative other than noted in the HPI          Physical Exam:   Vitals were reviewed  Initial vitals were reviewed  All vitals stable  Temp: 97.7  F (36.5  C) Temp src: Oral BP: (!) 144/89     Resp: 16   O2 Device: None (Room air)  BMI = 35  Constitutional:   awake, alert, cooperative, no apparent distress, and appears stated age     Neck:   Supple, symmetrical, trachea midline, no adenopathy, thyroid symmetric, not enlarged and no tenderness, skin normal     Lungs:   No increased work of breathing, good air exchange, clear to auscultation bilaterally, no crackles or wheezing     Cardiovascular:   Normal apical impulse, regular rate and rhythm, normal S1 and S2, no S3 or S4, and no murmur noted     Abdomen:   No scars, normal bowel sounds, soft, non-distended, non-tender, no masses palpated, no hepatosplenomegaly and Gravid     Genitounirinary:   External Genitalia:  General appearance; normal     Musculoskeletal:   There is no redness, warmth, or swelling of the joints.  Full range of motion noted.  Motor strength is 5 out of 5 all extremities bilaterally.  Tone is normal. 1+ pitting edema     Neurologic:   Awake, alert, oriented to name, place and time.  Cranial nerves II-XII are grossly intact.  Motor is 5 out of 5 bilaterally.  Cerebellar finger to nose, heel to shin intact.  Sensory is intact.  Babinski down going, Romberg negative, and gait is normal.     Neuropsychiatric:   General: normal, calm, and normal eye contact  Level of consciousness: alert /  normal  Affect: flat  Orientation: oriented to self, place, time and situation  Memory and insight: normal, memory for past and recent events intact, and thought process normal     Skin:   no bruising or bleeding, normal skin color, texture, turgor, and no redness, warmth, or swelling      Cervix:   Membranes: intact   Dilation: 4   Effacement: 100%   Station:0   Consistency: soft   Position: Mid  Presentation:Cephalic  Fetal Heart Rate Tracing: reactive and reassuring, appropriate for gestational age, Tier 1 (normal)  Tocometer: external monitor, frequency q 3 minutes, and adequate                       Assessment:   Brunilda Krishna is a Unknown pregnant female admitted with active labor management and consider AROM.          Plan:   Admit - see IP orders  Pain medication Epidural  Anticipate     Anupam Mari MD

## 2023-09-04 NOTE — PROGRESS NOTES
Up to BR for pericare. Pt was able to void. Mother and baby transferred to postpartum unit at 1500 via ambulatory and bassinet after completion of immediate recovery period. Patient oriented to room and instructed to call for assistance when up to the bathroom the next time. Mother and baby bonding well and in stable condition upon transfer.

## 2023-09-04 NOTE — PLAN OF CARE
Data: Vital signs within normal limits. Postpartum checks within normal limits - see flow record. Patient eating and drinking normally. Patient able to empty bladder independently and is up ambulating. No apparent signs of infection.  Periurethral lac  healing well. Patient performing self cares and is able to care for infant.  Action: Patient medicated during the shift for pain. See MAR. Patient reassessed within 1 hour after each medication and pain was improved - patient stated she was comfortable. Patient education done about breastfeeding, self-care, diaper changing, and post partum bleeding. See flow record.  Response: Positive attachment behaviors observed with infant. Support persons present.   Plan: Anticipate discharge on 9/6/23.

## 2023-09-04 NOTE — PROGRESS NOTES
"Labor Progress Note:     S: comfortable with epidural in place.   O:   /69 (BP Location: Right arm)   Pulse 75   Temp 98.4  F (36.9  C) (Oral)   Resp 16   Ht 1.702 m (5' 7\")   Wt 109.2 kg (240 lb 12.8 oz)   SpO2 95%   BMI 37.71 kg/m    Crx: 9/90/0, AROM thin meconium-stained fluid  FHT: baseline 125, mod [place], +accels, no decels  [place]: 2-3 in 10 min     A/P: 23 yo  at 40w2d by 9wk US who presents in spontaneous labor.   Labor: spon, AROM performed at 9cm, anticipate   Hypothyroidism: con home synthroid   gHTN: BPs normal since admission, HELLP labs pending  FWB: cat 1 reactive, con FM     Adia Sanders MD  OB/GYN       " I am ok with lipitor as this is the medication started as post hosp . Her kidney function is impaired so not recommended to be on lisinopril , please advise her to share few bp readings and will start her on another class of bp med,Thanks

## 2023-09-04 NOTE — PLAN OF CARE
S:Delivery  B:Spontaneous Labor,Unknown    No results found for: GBS with antibiotic treatment not indicated 4 hours prior to delivery.  A: Patient delivered   with periurethral lac that was repaired at 1241 with Dr. HOLGER Sanders in attendance and baby placed on mother's abdomen for delayed cord clamping. Baby dried and stimulated. Baby placed  skin to skin @ 1241. Apgars 9/9.Delivery .  IV infusion of Oxytocin  infused. Placenta removal spontaneous. MD does not want placenta sent to pathology.  See Flowsheet for VS and PP checks. Delivery QBL (mL): 350 mL.  Labor care plan goals met, transition now to postpartum care.  R: Expect routine postpartum care. Anticipate first feeding within the hour or whenever infant displays feeding cues. Continue skin to skin. Prior discussion with mother indicates that feeding plan is Breast feeding . Educated mother on importance of exclusive breastfeeding, expected feeding readiness cues and encouraged her to observe for these cues while rooming in. Informed her that breastfeeding assistance would be provided.

## 2023-09-04 NOTE — L&D DELIVERY NOTE
"Delivery Summary    Brunilda Krishna MRN# [id number]   Age: 22 year old YOB: 2000     ASSESSMENT & PLAN: Ms. Krishna is a 23 yo  at 40w2d by 9wk US who [facility]. She received prenatal care through the [facility] system but elected to delivery at [facility]. Her pregnancy was complicated by hashimotos's thyroiditis and she was maintained on 62.5mg of levothyroxine daily. She was noted to have mild range elevation of BP on admission which normalized during the intrapartum period. Her HELLP labs were notable only for anemia, hgb 10.0. An epidural was placed for pain management and she progressed to complete dilation. AROM of thin meconium-stained fluid was completed at 9cm. She pushed to a slow crown and delivered a vigorous female infant vertex, KATHRINE via . APGARs 9 and 9. Weight pending due to skin to skin contact. The placenta delivered via gentle cord traction and was noted to be intact with a 3V cord, but with a velamentous insertion. Fundus was firm with fundal massage and IV pitocin. There was a right periurethral laceration that I repaired with 3-0 vicryl. QBL 350cc.   Mom and baby \"Brenda\" were stable for transport to postpartum.        Tomi Female-Brunilda [[id number]]      Labor Event Times      Latent labor onset date/time: 9/3/2023 2130    Active labor onset date: 23 Onset time:  2:10 AM CDT   Dilation complete date: 23 Complete time: 10:47 AM   Start pushing date/time: 2023 1102          Labor Events     labor?: No   steroids: None  Labor Type: Spontaneous  Predominate monitoring during 1st stage: continuous electronic fetal monitoring     Antibiotics received during labor?: No       Rupture date/time: 23 0510   Rupture type: Artificial Rupture of Membranes, Spontaneous Rupture of Membranes  Fluid color: Clear, Meconium  Fluid odor: Normal     Augmentation: AROM       Delivery/Placenta Date and Time      Delivery Date: " 23 Delivery Time: 12:41 PM   Placenta Date/Time: 2023 12:44 PM  Oxytocin given at the time of delivery: after delivery of placenta  Delivering clinician: Adia Sanders MD   Other personnel present at delivery:  Provider Role   Batsheva Hayes, RN Charge Nurse   Petrona Urena RN Delivery Nurse   Mariella Abel, RN Delivery Nurse             Vaginal Counts       Initial count performed by 2 team members:  Two Team Members   Batsheva PACHECO NST         [place] Suture Needles Sponges (RETIRED) Instruments   Initial counts 2  5    Added to count  1     Relief counts       Final counts 2 1 5            Placed during labor Accounted for at the end of labor   FSE No NA   IUPC No NA   Cervidil No NA                  Final count performed by 2 team members:  Two Team Members   Freddy Sanders      Final count correct?: Yes  Pre-Birth Team Brief: Complete  Post-Birth Team Debrief: Complete       Apgars    Living status: Living   1 Minute 5 Minute 10 Minute 15 Minute 20 Minute   Skin color: 1  1       Heart rate: 2  2       Reflex irritability: 2  2       Muscle tone: 2  2       Respiratory effort: 2  2       Total: 9  9       Apgars assigned by: MARIELLA ABEL RN       Cord      Vessels: 3 Vessels    Cord Complications: None               Cord Blood Disposition: Discard    Gases Sent?: No    Delayed cord clamping?: Yes    Cord Clamping Delay (seconds):  seconds    Stem cell collection?: No           [place] Resuscitation    Methods: None       Skin to Skin and Feeding Plan      Skin to skin initiation date/time: 1841    Skin to skin with: Mother  Skin to skin end date/time:            Labor Events and Shoulder Dystocia    Fetal Tracing Prior to Delivery: Category 1  Fetal Tracing Comments: fetal bradycardia with crown for ~3min  Shoulder dystocia present?: Neg       Delivery (Maternal) (Provider to Complete) ([id number])    Episiotomy: None  Perineal lacerations: None       Periurethral laceration: left Repaired?: Yes   Repair suture: 3-0 Vicryl  Genital tract inspection done: Pos       Blood Loss  Mother: Brunilda Krishna #[id number]     Start of Mother's Information      Delivery Blood Loss  09/04/23 0210 - 09/04/23 1258      Delivery QBL (mL) Hospital Encounter 350 mL    Total  350 mL               End of Mother's Information  Mother: Brunilda Krishna #[id number]                Delivery - Provider to Complete ([id number])    Delivering clinician: Adia Sanders MD  Delivery Type (Choose the 1 that will go to the Birth History): Vaginal, Spontaneous                         Other personnel:  Provider Role   Batsheva Hayes, ARNIE Charge Nurse   Petrona Urena, RN Delivery Nurse   Mariella Romano, RN Delivery Nurse                    Placenta    Date/Time: 9/4/2023 12:44 PM  Removal: Spontaneous  Comments: 3V cord., intact, velamentous cord insertion  Disposition: Hospital disposal             Anesthesia    Method: Epidural                    Presentation and Position    Presentation: Vertex    Position: Right Occiput Anterior                     Adia Sanders MD

## 2023-09-04 NOTE — ANESTHESIA CARE TRANSFER NOTE
Patient: Brunilda Krishna    Procedure: * No procedures listed *       Diagnosis: * No pre-op diagnosis entered *  Diagnosis Additional Information: No value filed.    Anesthesia Type:   Epidural     Note:    Oropharynx: spontaneously breathing and oropharynx clear of all foreign objects  Level of Consciousness: awake  Oxygen Supplementation: room air    Independent Airway: airway patency satisfactory and stable  Dentition: dentition unchanged  Vital Signs Stable: post-procedure vital signs reviewed and stable  Report to RN Given: handoff report given  Patient transferred to: Labor and Delivery    Handoff Report: Identifed the Patient, Identified the Reponsible Provider, Reviewed the pertinent medical history, Discussed the surgical course, Reviewed Intra-OP anesthesia mangement and issues during anesthesia, Set expectations for post-procedure period and Allowed opportunity for questions and acknowledgement of understanding      Vitals:  Vitals Value Taken Time   /73 09/04/23 1436   Temp     Pulse     Resp     SpO2     Vitals shown include unvalidated device data.    Electronically Signed By: Woo Batres CRNA, NASRA MURO  September 4, 2023  2:46 PM

## 2023-09-04 NOTE — PROGRESS NOTES
Assumed care at approximately 0715.     0715: Patient sleeping.     0854: Dr. Sanders at the bedside. Fore bag ruptured; mec fluid. SVE 9/90/0.     0952: SVE 9.5//0. Patient repositioned.     1047: SVE complete.     1102: Patient started pushing 3x per contraction using the stir-ups.     1125: Dr. Sanders at the bedside to assess pushing efforts.     1230: Dr. Sanders at the bedside for delivery.     1241: Baby delivered.

## 2023-09-04 NOTE — ANESTHESIA PROCEDURE NOTES
"Epidural catheter Procedure Note    Pre-Procedure   Staff -        CRNA: Marj Arnold APRN CRNA       Performed By: CRNA       Location: OB       Procedure Start/Stop Times: 9/4/2023 2:29 AM and 9/4/2023 2:59 AM       Pre-Anesthestic Checklist: patient identified, IV checked, risks and benefits discussed, informed consent, monitors and equipment checked, pre-op evaluation and at physician/surgeon's request  Timeout:       Correct Patient: Yes        Correct Procedure: Yes        Correct Site: Yes        Correct Position: Yes   Procedure Documentation  Procedure: epidural catheter       Diagnosis: pain       Patient Position: sitting       Patient Prep/Sterile Barriers: sterile gloves, mask, patient draped       Skin prep: DuraPrep       Local skin infiltrated with 7 mL of 1% lidocaine.        Insertion Site: L3-4. (midline approach).       Technique: LORT saline        PRIYANKA at 7 cm.       Needle Type: ToBiGx Mediay needle       Needle Gauge: 17.        Needle Length (Inches): 3.5        Catheter: 19 G.          Catheter threaded easily.           Threaded 11 cm at skin.         # of attempts: 1 and  # of redirects:     Assessment/Narrative         Paresthesias: No.       Test dose of 5 mL lidocaine 1.5% w/ 1:200,000 epinephrine at 02:45 CDT.         Test dose negative, 3 minutes after injection, for signs of intravascular, subdural, or intrathecal injection.       Insertion/Infusion Method: LORT saline       Aspiration negative for Heme or CSF via Epidural Catheter.    Medication(s) Administered   Medication Administration Time: 9/4/2023 2:29 AM      FOR [facility] ([facility]) ONLY:   Pain Team Contact information: please page the Pain Team Via [employer]. Search \"Pain\". During daytime hours, please page the attending first. At night please page the resident first.      "

## 2023-09-05 ENCOUNTER — MEDICAL CORRESPONDENCE (OUTPATIENT)
Dept: HEALTH INFORMATION MANAGEMENT | Facility: CLINIC | Age: 23
End: 2023-09-05

## 2023-09-05 LAB
HGB BLD-MCNC: 8.9 G/DL (ref 11.7–15.7)
TSH SERPL DL<=0.005 MIU/L-ACNC: 3.59 UIU/ML (ref 0.3–4.2)

## 2023-09-05 PROCEDURE — 36415 COLL VENOUS BLD VENIPUNCTURE: CPT | Performed by: OBSTETRICS & GYNECOLOGY

## 2023-09-05 PROCEDURE — 250N000013 HC RX MED GY IP 250 OP 250 PS 637: Performed by: OBSTETRICS & GYNECOLOGY

## 2023-09-05 PROCEDURE — 120N000001 HC R&B MED SURG/OB

## 2023-09-05 PROCEDURE — 250N000011 HC RX IP 250 OP 636: Mod: JZ | Performed by: OBSTETRICS & GYNECOLOGY

## 2023-09-05 PROCEDURE — 99231 SBSQ HOSP IP/OBS SF/LOW 25: CPT | Performed by: OBSTETRICS & GYNECOLOGY

## 2023-09-05 PROCEDURE — 85018 HEMOGLOBIN: CPT | Performed by: OBSTETRICS & GYNECOLOGY

## 2023-09-05 RX ORDER — FERROUS SULFATE 325(65) MG
325 TABLET ORAL
Qty: 90 TABLET | Refills: 1 | Status: SHIPPED | OUTPATIENT
Start: 2023-09-05

## 2023-09-05 RX ORDER — MAGNESIUM 200 MG
1000 TABLET ORAL DAILY
Qty: 90 TABLET | Refills: 1 | Status: SHIPPED | OUTPATIENT
Start: 2023-09-05

## 2023-09-05 RX ORDER — ASCORBIC ACID 250 MG
250 TABLET,CHEWABLE ORAL DAILY
Qty: 90 TABLET | Refills: 1 | Status: SHIPPED | OUTPATIENT
Start: 2023-09-05

## 2023-09-05 RX ORDER — IBUPROFEN 800 MG/1
800 TABLET, FILM COATED ORAL EVERY 6 HOURS PRN
Qty: 30 TABLET | Refills: 1 | Status: SHIPPED | OUTPATIENT
Start: 2023-09-05

## 2023-09-05 RX ORDER — DOCUSATE SODIUM 100 MG/1
100 CAPSULE, LIQUID FILLED ORAL DAILY
Qty: 30 CAPSULE | Refills: 1 | Status: SHIPPED | OUTPATIENT
Start: 2023-09-06

## 2023-09-05 RX ORDER — ACETAMINOPHEN 325 MG/1
650 TABLET ORAL EVERY 4 HOURS PRN
COMMUNITY
Start: 2023-09-05

## 2023-09-05 RX ADMIN — ENOXAPARIN SODIUM 40 MG: 100 INJECTION SUBCUTANEOUS at 09:06

## 2023-09-05 RX ADMIN — IBUPROFEN 800 MG: 800 TABLET ORAL at 20:03

## 2023-09-05 RX ADMIN — DOCUSATE SODIUM 100 MG: 100 CAPSULE, LIQUID FILLED ORAL at 09:06

## 2023-09-05 RX ADMIN — IBUPROFEN 800 MG: 800 TABLET ORAL at 06:41

## 2023-09-05 ASSESSMENT — ACTIVITIES OF DAILY LIVING (ADL)
ADLS_ACUITY_SCORE: 20

## 2023-09-05 NOTE — PLAN OF CARE
Goal Outcome Evaluation:  Data: Vital signs within normal limits. Postpartum checks within normal limits - see flow record. Patient  Is tolerating po intake. Patient is able to empty bladder independently. . Patient ambulating independently..   No apparent signs of infection. perineum healing well. Patient Is performing self cares and Is able to care for infant. Positive attachment behaviors are observed with infant. Support persons are present.  Action:  Pain plan was discussed. Patient will request pain med when she is ready for it. Patient was not medicated during the shift for pain and cramping. See MAR.Patient education done about breastfeeding,  cares, postpartum cares, pain management/plan,  safety, and rest. See flow record.  Response:   Patient assessed with each nursing interaction. Patient stated that she was comfortable.    Plan: Anticipate discharge on 2023.

## 2023-09-05 NOTE — PLAN OF CARE
Data: Vital signs within normal limits. Postpartum checks within normal limits - see flow record. Patient  Is tolerating po intake. Patient is able to empty bladder independently. . Patient ambulating independently..   No apparent signs of infection. Lac 1st degree healing well. Patient Is performing self cares and Is able to care for infant. Positive attachment behaviors are observed with infant. Support persons are present.  Action:  Pain plan was discussed. Patient will request pain med when she is ready for it. Patient was not medicated during the shift for pain. See MAR.Patient education done about breastfeeding, formula feeding,  cares, and postpartum cares. See flow record.  Response:   Patient reassessed within 1 hour after each medication for pain. Patient stated that pain had improved. Patient stated that she was comfortable. .   Plan: Anticipate discharge on 23      Patient began shift with breastfeeding, but then decided she wanted to utilize formula.  Patient was experiencing breast pain.  Writer gave patient hydro gel pads and lanolin ointment.  Writer also provided education about trying to get a deeper latch from baby.

## 2023-09-05 NOTE — MEDICATION SCRIBE - ADMISSION MEDICATION HISTORY
Medication Scribe Admission Medication History    Admission medication history is complete. The information provided in this note is only as accurate as the sources available at the time of the update.    Medication reconciliation/reorder completed by provider prior to medication history? Yes    Information Source(s): Patient, Patient's pharmacy, Clinic records, and CareEverywhere/SureScripts via  room phone with patient and by phone with pharmacies.    Pertinent Information: I verified with patient that it has been since 2022 that patient last took her Levothyroxine at home.        Changes made to PTA medication list:  Added: None  Deleted: None  Changed: None    Medication Affordability:  Not including over the counter (OTC) medications, was there a time in the past 3 months when you did not take your medications as prescribed because of cost?: No    Allergies reviewed with patient and updates made in EHR: yes, updated reaction for Augmentin.    Medication History Completed By: Candy Hernandez 9/5/2023 10:33 AM    Prior to Admission medications    Medication Sig Last Dose Taking? Auth Provider Long Term End Date   levothyroxine (SYNTHROID/LEVOTHROID) 125 MCG tablet Take 0.5 tablets (62.5 mcg) by mouth daily Since 2022 at Unknown Yes Beverley Jeff, NASRA CNP Yes    Prenatal Vit-Fe Fumarate-FA (PRENATAL VITAMIN PO) Take 1 tablet by mouth every evening 9/2/2023 at pm Yes Reported, Patient

## 2023-09-05 NOTE — PROGRESS NOTES
[facility] OB/GYN Department    Post-Partum Progress Note: PPD #1    Name: Brunilda Krishna  Date: 9/5/2023    Subjective:   Patient seen and examined.  No complaints.  Pain well controlled.  Tolerating regular diet, no nausea or vomiting.  Ambulating and voiding without difficulty.  Lochia mild.  breast feeding.  No headache, visual disturbance or RUQ pain.    ROS:    General/Constitutional:  Denies chills or fever  Respiratory: Denies shortness of breath  Cardiovascular: Denies chest pain  Gastrointestinal:  +mild uterine cramping, no nausea or vomiting  Genitourinary: Denies difficulty urinating  Musculoskeletal: Denies  peripheral edema      Objective:     Intake/Output Summary (Last 24 hours) at 9/5/2023 1227  Last data filed at 9/4/2023 1500  Gross per 24 hour   Intake --   Output 511 ml   Net -511 ml       Patient Vitals for the past 24 hrs:   BP Temp Temp src Pulse Resp SpO2   09/05/23 0853 133/73 97.8  F (36.6  C) Oral 87 18 99 %   09/05/23 0620 -- -- -- -- -- 98 %   09/05/23 0619 133/80 97.8  F (36.6  C) Oral 78 18 --   09/05/23 0031 -- -- -- -- -- 99 %   09/05/23 0030 (!) 143/97 -- -- -- -- 99 %   09/04/23 1945 128/88 97.5  F (36.4  C) Oral 77 16 99 %   09/04/23 1638 129/83 -- -- 75 16 94 %   09/04/23 1430 113/73 -- -- -- -- --   09/04/23 1415 110/75 -- -- -- -- --   09/04/23 1400 109/75 98.5  F (36.9  C) Axillary -- -- --   09/04/23 1345 117/83 -- -- -- -- --   09/04/23 1330 (!) 126/91 -- -- -- -- --   09/04/23 1315 (!) 122/90 -- -- -- -- --   09/04/23 1245 (!) 170/92 -- -- -- -- --       Recent Labs   Lab 09/05/23  0600 09/04/23  0011   HGB 8.9* 10.0*       General appearance: well-hydrated, A&O x 3, no apparent distress  ENT: EOMI, sclera anicteric   Lungs: Equal expansion bilaterally, no accessory muscle use  Heart: No heaves or thrills. No peripheral varicosities  Constitutional: See vitals  Abdomen: Soft, non-distended, no rebound or rigidity   Uterus: Firm at umbilicus with non-tender  fundus   Neurologic: CN II-XII grossly intact, no lateralizing defects, no gross movement abnormalities  Extremities: trace edema, no calf tenderness    Assessment and Plan:    22 year old   s/p vaginal delivery, PPD#1.  Routine postpartum cares  Anemia - plan iron, vitamin C and B12.  Hashimoto's thyroiditis.  On levothyroxine 62.5 mg daily.  Last TSH was 3 months ago.  Will recheck.  Gestational hypertension.  Improved since admit.  BP stable.  HELLP labs normal.  Continue to monitor BP.  Likely discharge tomorrow.    Karen Bentley MD

## 2023-09-06 VITALS
HEART RATE: 74 BPM | SYSTOLIC BLOOD PRESSURE: 136 MMHG | RESPIRATION RATE: 18 BRPM | OXYGEN SATURATION: 99 % | WEIGHT: 232.5 LBS | DIASTOLIC BLOOD PRESSURE: 85 MMHG | BODY MASS INDEX: 36.49 KG/M2 | TEMPERATURE: 97.8 F | HEIGHT: 67 IN

## 2023-09-06 PROBLEM — O13.9 GESTATIONAL HYPERTENSION: Status: ACTIVE | Noted: 2023-09-06

## 2023-09-06 PROCEDURE — 99238 HOSP IP/OBS DSCHRG MGMT 30/<: CPT | Performed by: OBSTETRICS & GYNECOLOGY

## 2023-09-06 PROCEDURE — 250N000011 HC RX IP 250 OP 636: Mod: JZ | Performed by: OBSTETRICS & GYNECOLOGY

## 2023-09-06 PROCEDURE — 250N000013 HC RX MED GY IP 250 OP 250 PS 637: Performed by: OBSTETRICS & GYNECOLOGY

## 2023-09-06 RX ORDER — MULTIVIT WITH MINERALS/LUTEIN
1000 TABLET ORAL DAILY
Qty: 100 TABLET | Refills: 0 | Status: SHIPPED | OUTPATIENT
Start: 2023-09-06

## 2023-09-06 RX ORDER — FERROUS GLUCONATE 324(38)MG
324 TABLET ORAL 2 TIMES DAILY
Qty: 100 TABLET | Refills: 0 | Status: SHIPPED | OUTPATIENT
Start: 2023-09-06

## 2023-09-06 RX ADMIN — ENOXAPARIN SODIUM 40 MG: 100 INJECTION SUBCUTANEOUS at 08:44

## 2023-09-06 RX ADMIN — IBUPROFEN 800 MG: 800 TABLET ORAL at 08:44

## 2023-09-06 RX ADMIN — DOCUSATE SODIUM 100 MG: 100 CAPSULE, LIQUID FILLED ORAL at 08:44

## 2023-09-06 ASSESSMENT — ACTIVITIES OF DAILY LIVING (ADL)
ADLS_ACUITY_SCORE: 20

## 2023-09-06 NOTE — DISCHARGE INSTRUCTIONS
Warning Signs after Having a Baby    Keep this paper on your fridge or somewhere else where you can see it.    Call your provider if you have any of these symptoms up to 12 weeks after having your baby.    Thoughts of hurting yourself or your baby  Pain in your chest or trouble breathing  Severe headache not helped by pain medicine  Eyesight concerns (blurry vision, seeing spots or flashes of light, other changes to eyesight)  Fainting, shaking or other signs of a seizure    Call 9-1-1 if you feel that it is an emergency.     The symptoms below can happen to anyone after giving birth. They can be very serious. Call your provider if you have any of these warning signs.    My provider s phone number: _______________________    Losing too much blood (hemorrhage)    Call your provider if you soak through a pad in less than an hour or pass blood clots bigger than a golf ball. These may be signs that you are bleeding too much.    Blood clots in the legs or lungs    After you give birth, your body naturally clots its blood to help prevent blood loss. Sometimes this increased clotting can happen in other areas of the body, like the legs or lungs. This can block your blood flow and be very dangerous.     Call your provider if you:  Have a red, swollen spot on the back of your leg that is warm or painful when you touch it.   Are coughing up blood.     Infection    Call your provider if you have any of these symptoms:  Fever of 100.4 F (38 C) or higher.  Pain or redness around your stitches if you had an incision.   Any yellow, white, or green fluid coming from places where you had stitches or surgery.    Mood Problems (postpartum depression)    Many people feel sad or have mood changes after having a baby. But for some people, these mood swings are worse.     Call your provider right away if you feel so anxious or nervous that you can't care for yourself or your baby.    Preeclampsia (high blood pressure)    Even if you  didn't have high blood pressure when you were pregnant, you are at risk for the high blood pressure disease called preeclampsia. This risk can last up to 12 weeks after giving birth.     Call your provider if you have:   Pain on your right side under your rib cage  Sudden swelling in the hands and face    Remember: You know your body. If something doesn't feel right, get medical help.     For informational purposes only. Not to replace the advice of your health care provider. Copyright 2020 [facility]. All rights reserved. Clinically reviewed by Montserrat Armenta, RNC-OB, MSN. [place] [id number] - Rev 02/23.

## 2023-09-06 NOTE — DISCHARGE SUMMARY
[facility] Discharge Summary    Brunilda Krishna MRN# [id number]   Age: 22 year old YOB: 2000     Date of Admission:  9/3/2023  Date of Discharge::  9/6/2023  Admitting Physician:  Anupam Mari MD  Discharge Physician:  Anupam Mari MD     Home clinic: [facility]          Admission Diagnoses:   Encounter for supervision of normal first pregnancy in third trimester [Z34.03]          Discharge Diagnosis:     Normal spontaneous vaginal delivery  Intrauterine pregnancy at 40+3 weeks gestation  Gestational hypertension- labile          Procedures:     Procedure(s): No additional procedures performed       No procedures performed during this admission           Medications Prior to Admission:     Medications Prior to Admission   Medication Sig Dispense Refill Last Dose    levothyroxine (SYNTHROID/LEVOTHROID) 125 MCG tablet Take 0.5 tablets (62.5 mcg) by mouth daily 16 tablet 11 since 2022 at Unknown    Prenatal Vit-Fe Fumarate-FA (PRENATAL VITAMIN PO) Take 1 tablet by mouth every evening   9/2/2023 at pm             Discharge Medications:     Current Discharge Medication List        START taking these medications    Details   acetaminophen (TYLENOL) 325 MG tablet Take 2 tablets (650 mg) by mouth every 4 hours as needed for mild pain or fever (greater than or equal to 38  C /100.4  F (oral) or 38.5  C/ 101.4  F (core).)    Associated Diagnoses: Anemia due to blood loss, acute      ascorbic acid (VITAMIN C) 250 MG CHEW chewable tablet Take 1 tablet (250 mg) by mouth daily Take with iron supplement.  Qty: 90 tablet, Refills: 1    Associated Diagnoses: Anemia due to blood loss, acute      cyanocobalamin 1000 MCG sublingual tablet Place 1 tablet (1,000 mcg) under the tongue daily  Qty: 90 tablet, Refills: 1    Associated Diagnoses: Anemia due to blood loss, acute      docusate sodium (COLACE) 100 MG capsule Take 1 capsule (100 mg) by mouth daily  Qty: 30 capsule, Refills:  1    Associated Diagnoses: Anemia due to blood loss, acute      ferrous sulfate (FEROSUL) 325 (65 Fe) MG tablet Take 1 tablet (325 mg) by mouth daily (with breakfast)  Qty: 90 tablet, Refills: 1    Associated Diagnoses: Anemia due to blood loss, acute      ibuprofen (ADVIL/MOTRIN) 800 MG tablet Take 1 tablet (800 mg) by mouth every 6 hours as needed for other (cramping)  Qty: 30 tablet, Refills: 1    Associated Diagnoses: Anemia due to blood loss, acute           CONTINUE these medications which have NOT CHANGED    Details   levothyroxine (SYNTHROID/LEVOTHROID) 125 MCG tablet Take 0.5 tablets (62.5 mcg) by mouth daily  Qty: 16 tablet, Refills: 11    Associated Diagnoses: Hashimoto's thyroiditis      Prenatal Vit-Fe Fumarate-FA (PRENATAL VITAMIN PO) Take 1 tablet by mouth every evening         Ferrous gluconate 324 1 po BID  Vitamin C  I tab daily          Consultations:   No consultations were requested during this admission          Brief History of Labor:   Delivery Summary     Brunilda Krishna MRN# [id number]   Age: 22 year old YOB: 2000      ASSESSMENT & PLAN: Ms. Krishna is a 21 yo  at 40w2d by 9wk  who [facility]. She received prenatal care through the [facility] system but elected to delivery at [facility]. Her pregnancy was complicated by hashimotos's thyroiditis and she was maintained on 62.5mg of levothyroxine daily. She was noted to have mild range elevation of BP on admission which normalized during the intrapartum period. Her HELLP labs were notable only for anemia, hgb 10.0. An epidural was placed for pain management and she progressed to complete dilation. AROM of thin meconium-stained fluid was completed at 9cm. She pushed to a slow crown and delivered a vigorous female infant vertex, KATHRINE via . APGARs 9 and 9. Weight pending due to skin to skin contact. The placenta delivered via gentle cord traction and was noted to be intact with a 3V  "cord, but with a velamentous insertion. Fundus was firm with fundal massage and IV pitocin. There was a right periurethral laceration that I repaired with 3-0 vicryl. QBL 350cc.   Mom and baby \"Brenda\" were stable for transport to postpartum.                    Hospital Course:   The patient's hospital course was unremarkable.  On discharge, her pain was well controlled. Vaginal bleeding is similar to peak menstrual flow.  Voiding without difficulty.  Ambulating well and tolerating a normal diet.  No fever.  Breastfeeding well.  Infant is stable.  No bowel movement yet.*  She was discharged on post-partum day #2.  BP (!) 150/85 (BP Location: Right arm)   Pulse 74   Temp 97.8  F (36.6  C) (Oral)   Resp 18   Ht 1.702 m (5' 7\")   Wt 109.2 kg (240 lb 12.8 oz)   SpO2 99%   Breastfeeding Unknown   BMI 37.71 kg/m    Exam:  Constitutional: healthy, alert, and no distress  Head: Normocephalic. No masses, lesions, tenderness or abnormalities  Neck:   ENT:   Cardiovascular: negative  Respiratory: negative  Gastrointestinal: Abdomen soft, non-tender. BS normal. No masses, organomegaly FF@U-2  : Deferred  Musculoskeletal: extremities normal- no gross deformities noted, gait normal, and normal muscle tone  Skin: no suspicious lesions or rashes  Neurologic: Gait normal. Reflexes normal and symmetric. Sensation grossly WNL.  Psychiatric: mentation appears normal and affect flat      Post-partum hemoglobin:   Hemoglobin   Date Value Ref Range Status   09/05/2023 8.9 (L) 11.7 - 15.7 g/dL Final   01/07/2021 13.2 11.7 - 15.7 g/dL Final             Discharge Instructions and Follow-Up:     Discharge diet: Regular   Discharge activity: Activity as tolerated   Discharge follow-up: Follow up with primary care provider in 3-5 days   Wound care: Drink plenty of fluids           Discharge Disposition:     Discharged to home      Attestation:  I have reviewed today's vital signs, notes, medications, labs and imaging.  Amount of time " performed on this discharge summary: 15 minutes.    Anupam Mari MD

## 2023-09-06 NOTE — PLAN OF CARE
Patient discharged per wheelchair with infant in car seat. Mother verified that her band matches her infant's band by comparing the infant's  MR#.  Discharge instructions given. Encouraged to call for any problems, questions or concerns. RXs ibu.      Plan of Care Reviewed With: patient, significant other

## 2023-09-06 NOTE — PLAN OF CARE
Data: Vital signs within normal limits. Postpartum checks within normal limits - see flow record. Patient  Is tolerating po intake. Patient is able to empty bladder independently. . Patient ambulating independently..   No apparent signs of infection. perineum healing well. Patient Is performing self cares and Is able to care for infant. Positive attachment behaviors are observed with infant. Support persons are present.  Action:  Pain plan was discussed. Patient will request pain med when she is ready for it. Patient was medicated during the shift for pain and cramping. See MAR.Patient education done about breastfeeding,  cares, postpartum cares, pain management/plan,  safety, and rest. See flow record.  Response:   Patient reassessed within 1 hour after each medication for pain. Patient stated that pain had improved. Patient stated that she was comfortable. .   Plan: Anticipate discharge on 2023  .

## 2023-09-08 ENCOUNTER — PATIENT OUTREACH (OUTPATIENT)
Dept: CARE COORDINATION | Facility: CLINIC | Age: 23
End: 2023-09-08
Payer: COMMERCIAL

## 2023-09-08 NOTE — PROGRESS NOTES
[facility] Contact  [facility]/Voicemail     Clinical Data: Post-Discharge Outreach     Outreach attempted x 2.  Left message on patient's voicemail, providing [facility]'s 24/7 scheduling and nurse triage phone number [phone] ([phone]) for questions/concerns and/or to schedule an appt with an [facility] provider, if they do not have a PCP.      Plan:  [facility] will do no further outreaches at this time.     BOB Kaiser  [phone]  [facility]     *Connected Care Resource Team does NOT follow patient ongoing. Referrals are identified based on internal discharge reports and the outreach is to ensure patient has an understanding of their discharge instructions.

## 2024-06-16 ENCOUNTER — HEALTH MAINTENANCE LETTER (OUTPATIENT)
Age: 24
End: 2024-06-16

## 2024-08-14 ENCOUNTER — HOSPITAL ENCOUNTER (EMERGENCY)
Facility: CLINIC | Age: 24
Discharge: HOME | End: 2024-08-14
Payer: COMMERCIAL

## 2024-08-14 VITALS
SYSTOLIC BLOOD PRESSURE: 130 MMHG | TEMPERATURE: 97.8 F | HEART RATE: 86 BPM | RESPIRATION RATE: 16 BRPM | OXYGEN SATURATION: 100 % | DIASTOLIC BLOOD PRESSURE: 90 MMHG

## 2024-08-14 DIAGNOSIS — G43.909 MIGRAINE: ICD-10-CM

## 2024-08-14 PROCEDURE — 96372 THER/PROPH/DIAG INJ SC/IM: CPT

## 2024-08-14 PROCEDURE — 250N000011 HC RX IP 250 OP 636

## 2024-08-14 PROCEDURE — 250N000009 HC RX 250

## 2024-08-14 PROCEDURE — 99213 OFFICE O/P EST LOW 20 MIN: CPT

## 2024-08-14 PROCEDURE — G0463 HOSPITAL OUTPT CLINIC VISIT: HCPCS | Mod: 25

## 2024-08-14 RX ORDER — SUMATRIPTAN 25 MG/1
25 TABLET, FILM COATED ORAL
Qty: 26 TABLET | Refills: 0 | Status: SHIPPED | OUTPATIENT
Start: 2024-08-14 | End: 2024-09-03

## 2024-08-14 RX ORDER — DEXAMETHASONE SODIUM PHOSPHATE 4 MG/ML
10 VIAL (ML) INJECTION ONCE
Status: COMPLETED | OUTPATIENT
Start: 2024-08-14 | End: 2024-08-14

## 2024-08-14 RX ORDER — KETOROLAC TROMETHAMINE 30 MG/ML
30 INJECTION, SOLUTION INTRAMUSCULAR; INTRAVENOUS ONCE
Status: COMPLETED | OUTPATIENT
Start: 2024-08-14 | End: 2024-08-14

## 2024-08-14 RX ADMIN — DEXAMETHASONE SODIUM PHOSPHATE 10 MG: 4 INJECTION, SOLUTION INTRAMUSCULAR; INTRAVENOUS at 17:21

## 2024-08-14 RX ADMIN — KETOROLAC TROMETHAMINE 30 MG: 30 INJECTION, SOLUTION INTRAMUSCULAR at 17:21

## 2024-08-14 ASSESSMENT — COLUMBIA-SUICIDE SEVERITY RATING SCALE - C-SSRS
2. HAVE YOU ACTUALLY HAD ANY THOUGHTS OF KILLING YOURSELF IN THE PAST MONTH?: NO
6. HAVE YOU EVER DONE ANYTHING, STARTED TO DO ANYTHING, OR PREPARED TO DO ANYTHING TO END YOUR LIFE?: NO
1. IN THE PAST MONTH, HAVE YOU WISHED YOU WERE DEAD OR WISHED YOU COULD GO TO SLEEP AND NOT WAKE UP?: NO

## 2024-08-14 NOTE — Clinical Note
Brunilda Krishna was seen and treated in our emergency department on 8/14/2024.  She may return to work on 08/15/2024.       If you have any questions or concerns, please don't hesitate to call.      Paloma Franklin PA-C

## 2024-08-14 NOTE — ED PROVIDER NOTES
History     Chief Complaint   Patient presents with    Headache     HPI  Brunilda Krishna is a 23 year old female who presents to urgent care accompanied by mother with chief complaint of migraine.  Patient reports she developed a migraine yesterday afternoon.  Patient describes migraine as a band around her head.  She also notes some slight sensitivity and blurred visions at times.  She vomited twice this morning.  She does have history of migraines but states they have been worsening over the last year.  She is not sure how many times per month these migraines occur or how long they last.  She has not seen anyone for this.  Denies numbness, weakness, tingling of extremities or face.    Allergies:  Allergies   Allergen Reactions    Amoxicillin-Pot Clavulanate Diarrhea and Rash       Problem List:    Patient Active Problem List    Diagnosis Date Noted    Gestational hypertension 09/06/2023     Priority: Medium    Encounter for supervision of normal first pregnancy in third trimester 09/03/2023     Priority: Medium    Encounter for triage in pregnant patient 08/25/2023     Priority: Medium    Hashimoto's thyroiditis 09/21/2016     Priority: Medium    Enlarged thyroid gland 04/12/2016     Priority: Medium    Arthritis 05/30/2014     Priority: Medium    Depression, major, single episode, moderate (H) 06/21/2013     Priority: Medium    Acne 08/09/2012     Priority: Medium    Behavior problems 01/22/2010     Priority: Medium     Frequent tantrums- in counseling           Past Medical History:    Past Medical History:   Diagnosis Date    Hypothyroid     Nonsuppurative otitis media, not specified as acute or chronic     Other developmental speech or language disorder     Reflux esophagitis     Unspecified asthma(493.90)        Past Surgical History:    Past Surgical History:   Procedure Laterality Date    PE TUBES  3/2007    PE TUBES  12-09    SURGICAL HISTORY OF -   2/5/2003     Bilateral myringotomy with tubes     SURGICAL HISTORY OF -   2/21/2005    Bilateral myringotomy with tubes and adenoidectomy       Family History:    Family History   Problem Relation Age of Onset    Asthma Mother     Asthma Maternal Grandmother     Allergies Maternal Grandmother     Diabetes Maternal Grandmother     Hypertension Maternal Grandfather     Cancer Paternal Grandmother     Hypothyroidism Other         history on both sides    C.A.D. No family hx of     Cerebrovascular Disease No family hx of     Breast Cancer No family hx of     Cancer - colorectal No family hx of     Prostate Cancer No family hx of        Social History:  Marital Status:  Single [1]  Social History     Tobacco Use    Smoking status: Never    Smokeless tobacco: Never   Substance Use Topics    Alcohol use: No    Drug use: No        Medications:    SUMAtriptan (IMITREX) 25 MG tablet  acetaminophen (TYLENOL) 325 MG tablet  ascorbic acid (VITAMIN C) 250 MG CHEW chewable tablet  cyanocobalamin 1000 MCG sublingual tablet  docusate sodium (COLACE) 100 MG capsule  ferrous gluconate (FERGON) 324 (38 Fe) MG tablet  ferrous sulfate (FEROSUL) 325 (65 Fe) MG tablet  ibuprofen (ADVIL/MOTRIN) 800 MG tablet  levothyroxine (SYNTHROID/LEVOTHROID) 125 MCG tablet  Prenatal Vit-Fe Fumarate-FA (PRENATAL VITAMIN PO)  vitamin C (ASCORBIC ACID) 1000 MG TABS          Review of Systems   All other systems reviewed and are negative.      Physical Exam   BP: (!) 130/90  Pulse: 86  Temp: 97.8  F (36.6  C)  Resp: 16  SpO2: 100 %      Physical Exam  Vitals and nursing note reviewed.   Constitutional:       General: She is not in acute distress.     Appearance: Normal appearance. She is not ill-appearing.   HENT:      Head: Normocephalic.      Right Ear: Tympanic membrane, ear canal and external ear normal.      Left Ear: Tympanic membrane, ear canal and external ear normal.      Nose: No congestion or rhinorrhea.   Eyes:      Extraocular Movements: Extraocular movements intact.      Conjunctiva/sclera:  Conjunctivae normal.      Pupils: Pupils are equal, round, and reactive to light.   Cardiovascular:      Rate and Rhythm: Normal rate.      Pulses: Normal pulses.   Pulmonary:      Effort: Pulmonary effort is normal.   Neurological:      General: No focal deficit present.      Mental Status: She is alert.   Psychiatric:         Mood and Affect: Mood normal. Affect is flat.         Speech: Speech normal.         Behavior: Behavior normal.         ED Course        Procedures        No results found for this or any previous visit (from the past 24 hour(s)).    Medications   ketorolac (TORADOL) injection 30 mg (30 mg Intramuscular $Given 8/14/24 1721)   dexAMETHasone (DECADRON) injectable solution used ORALLY 10 mg (10 mg Oral $Given 8/14/24 1721)       Assessments & Plan (with Medical Decision Making)     I have reviewed the nursing notes.    I have reviewed the findings, diagnosis, plan and need for follow up with the patient.          Medical Decision Making  Patient is a 23 year old female who presents to urgent care accompanied by mother with chief complaint of migraine.  Patient reports she developed a migraine yesterday afternoon.  Patient describes migraine as a band around her head.  She also notes some slight sensitivity and blurred visions at times.  She vomited twice this morning.  She does have history of migraines but states they have been worsening over the last year.  She is not sure how many times per month these migraines occur or how long they last.  She has not seen anyone for this.  Denies numbness, weakness, tingling of extremities or face.      Exam above.  Patient in no acute distress, flat affect.    Toradol injection given IM today along with oral Decadron.    Patient is not sure if this is helping or not.    I did prescribe sumatriptan which may be taken at the beginning of migraine.  I also recommended follow-up with neurology, referral was placed today.    Patient should return to ER if  symptoms do not improve over the next 2 days.    Prior to making a final disposition on this patient the results of patient's tests and other diagnostic studies were discussed with the patient. All questions were answered. Patient expressed understanding of the plan and was amenable to it.     Disclaimer: This note consists of symbols derived from keyboarding, dictation and/or voice recognition software. As a result, there may be errors in the script that have gone undetected. Please consider this when interpreting information found in this chart.      Discharge Medication List as of 8/14/2024  5:45 PM        START taking these medications    Details   SUMAtriptan (IMITREX) 25 MG tablet Take 1 tablet (25 mg) by mouth at onset of headache for migraine May repeat in 2 hours. Max 8 tablets/24 hours., Disp-26 tablet, R-0, E-Prescribe             Final diagnoses:   Migraine       8/14/2024   [facility] EMERGENCY DEPT       Paloma Franklin PA-C  08/14/24 [id number]

## 2024-08-14 NOTE — ED TRIAGE NOTES
Headache since this morning has had them in the past and has had more lately.  Rosalia Haley MA

## 2024-09-14 ENCOUNTER — HOSPITAL ENCOUNTER (EMERGENCY)
Facility: CLINIC | Age: 24
Discharge: HOME | End: 2024-09-14
Payer: COMMERCIAL

## 2024-09-14 VITALS
RESPIRATION RATE: 18 BRPM | OXYGEN SATURATION: 99 % | WEIGHT: 230 LBS | TEMPERATURE: 97.8 F | HEIGHT: 67 IN | HEART RATE: 71 BPM | BODY MASS INDEX: 36.1 KG/M2 | DIASTOLIC BLOOD PRESSURE: 75 MMHG | SYSTOLIC BLOOD PRESSURE: 133 MMHG

## 2024-09-14 DIAGNOSIS — G43.001 MIGRAINE WITHOUT AURA AND WITH STATUS MIGRAINOSUS, NOT INTRACTABLE: Primary | ICD-10-CM

## 2024-09-14 PROBLEM — O99.211 OBESITY AFFECTING PREGNANCY IN FIRST TRIMESTER: Status: ACTIVE | Noted: 2023-02-24

## 2024-09-14 LAB
HOLD SPECIMEN: NORMAL

## 2024-09-14 PROCEDURE — 99284 EMERGENCY DEPT VISIT MOD MDM: CPT | Mod: 25

## 2024-09-14 PROCEDURE — 96375 TX/PRO/DX INJ NEW DRUG ADDON: CPT

## 2024-09-14 PROCEDURE — 96361 HYDRATE IV INFUSION ADD-ON: CPT

## 2024-09-14 PROCEDURE — 96374 THER/PROPH/DIAG INJ IV PUSH: CPT

## 2024-09-14 PROCEDURE — 250N000011 HC RX IP 250 OP 636

## 2024-09-14 PROCEDURE — 99283 EMERGENCY DEPT VISIT LOW MDM: CPT

## 2024-09-14 PROCEDURE — 258N000003 HC RX IP 258 OP 636

## 2024-09-14 RX ORDER — PANTOPRAZOLE SODIUM 40 MG/1
TABLET, DELAYED RELEASE ORAL
COMMUNITY
Start: 2024-06-01

## 2024-09-14 RX ORDER — CHLORHEXIDINE GLUCONATE ORAL RINSE 1.2 MG/ML
SOLUTION DENTAL
COMMUNITY
Start: 2024-04-02

## 2024-09-14 RX ORDER — HYDROCODONE BITARTRATE AND ACETAMINOPHEN 5; 325 MG/1; MG/1
TABLET ORAL
COMMUNITY
Start: 2024-04-02

## 2024-09-14 RX ORDER — ONDANSETRON 4 MG/1
4-8 TABLET, ORALLY DISINTEGRATING ORAL
COMMUNITY
Start: 2024-06-01

## 2024-09-14 RX ORDER — PENICILLIN V POTASSIUM 500 MG/1
TABLET, FILM COATED ORAL
COMMUNITY
Start: 2024-04-02

## 2024-09-14 RX ORDER — DROPERIDOL 2.5 MG/ML
2.5 INJECTION, SOLUTION INTRAMUSCULAR; INTRAVENOUS ONCE
Status: COMPLETED | OUTPATIENT
Start: 2024-09-14 | End: 2024-09-14

## 2024-09-14 RX ORDER — TOPIRAMATE 25 MG/1
25 TABLET, FILM COATED ORAL 2 TIMES DAILY
Qty: 60 TABLET | Refills: 0 | Status: SHIPPED | OUTPATIENT
Start: 2024-09-14 | End: 2024-10-14

## 2024-09-14 RX ORDER — MECLIZINE HYDROCHLORIDE 25 MG/1
25 TABLET ORAL
COMMUNITY
Start: 2024-06-01

## 2024-09-14 RX ORDER — KETOROLAC TROMETHAMINE 15 MG/ML
15 INJECTION, SOLUTION INTRAMUSCULAR; INTRAVENOUS ONCE
Status: COMPLETED | OUTPATIENT
Start: 2024-09-14 | End: 2024-09-14

## 2024-09-14 RX ADMIN — SODIUM CHLORIDE, POTASSIUM CHLORIDE, SODIUM LACTATE AND CALCIUM CHLORIDE 1000 ML: 600; 310; 30; 20 INJECTION, SOLUTION INTRAVENOUS at 15:21

## 2024-09-14 RX ADMIN — KETOROLAC TROMETHAMINE 15 MG: 15 INJECTION, SOLUTION INTRAMUSCULAR; INTRAVENOUS at 15:22

## 2024-09-14 RX ADMIN — DROPERIDOL 2.5 MG: 2.5 INJECTION, SOLUTION INTRAMUSCULAR; INTRAVENOUS at 16:19

## 2024-09-14 ASSESSMENT — ACTIVITIES OF DAILY LIVING (ADL)
ADLS_ACUITY_SCORE: 35

## 2024-09-14 ASSESSMENT — COLUMBIA-SUICIDE SEVERITY RATING SCALE - C-SSRS
6. HAVE YOU EVER DONE ANYTHING, STARTED TO DO ANYTHING, OR PREPARED TO DO ANYTHING TO END YOUR LIFE?: NO
1. IN THE PAST MONTH, HAVE YOU WISHED YOU WERE DEAD OR WISHED YOU COULD GO TO SLEEP AND NOT WAKE UP?: NO
2. HAVE YOU ACTUALLY HAD ANY THOUGHTS OF KILLING YOURSELF IN THE PAST MONTH?: NO
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
## 2024-09-14 NOTE — ED NOTES
Pt is awake now, and reports relief of HA s/s currently.  Pt states she is well enough to go home.   Will update Dr. Spears
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
## 2024-09-15 NOTE — DISCHARGE INSTRUCTIONS
You were seen in the emergency department today for headache.  This is likely due to migraine headache.  We gave you several medications including a medication called ketorolac, as well as a medication called droperidol, that helped your headache, as well as fluids.    Going forward, I am sending with a prescription for medication called topiramate, also called Topamax.  You should take this as prescribed twice daily every day, whether you are having headaches or not.  This medicine can help prevent headaches from coming on.  Try this for the next month, and follow-up with your neurologist as scheduled in October for discussion of whether you should continue this, or try something different.    Return to the emergency department with new or worsening symptoms that you find concerning.

## 2024-10-28 ENCOUNTER — PRE VISIT (OUTPATIENT)
Dept: NEUROLOGY | Facility: CLINIC | Age: 24
End: 2024-10-28

## 2024-12-17 NOTE — TELEPHONE ENCOUNTER
RECORDS RECEIVED FROM: Care Everywhere   REASON FOR VISIT: Migraine   PROVIDER: Sheila Coffman MD   DATE OF APPT: 3/10/25 @ 8 am    NOTES (FOR ALL VISITS) STATUS DETAILS   OFFICE NOTE from referring provider Internal ED Referral    OFFICE NOTE from other specialist Care Everywhere 12/13/22 L[facility] Cool MD [facility]     11[facility]22 [facility]      11/15/22 Iraj Goff MD  @[facility]      DISCHARGE REPORT from the ER Internal 8/14/24 Paloma Franklin PA-C @[facility] ED      MEDICATION LIST Internal     IMAGING  (FOR ALL VISITS)       CT (HEAD, NECK, SPINE) PACS [facility]  6/19/20 CT Cervical Spine  6/19/20 CT Head

## 2025-03-10 ENCOUNTER — PRE VISIT (OUTPATIENT)
Dept: NEUROLOGY | Facility: CLINIC | Age: 25
End: 2025-03-10

## (undated) RX ORDER — FENTANYL CITRATE 50 UG/ML
INJECTION, SOLUTION INTRAMUSCULAR; INTRAVENOUS
Status: DISPENSED
Start: 2023-09-04